# Patient Record
Sex: MALE | Race: WHITE | Employment: UNEMPLOYED | URBAN - NONMETROPOLITAN AREA
[De-identification: names, ages, dates, MRNs, and addresses within clinical notes are randomized per-mention and may not be internally consistent; named-entity substitution may affect disease eponyms.]

---

## 2005-04-14 LAB — TSH SERPL DL<=0.05 MIU/L-ACNC: NORMAL UIU/ML

## 2015-09-03 LAB — DIABETIC RETINOPATHY: POSITIVE

## 2019-05-23 ENCOUNTER — OFFICE VISIT (OUTPATIENT)
Dept: FAMILY MEDICINE CLINIC | Age: 84
End: 2019-05-23
Payer: MEDICARE

## 2019-05-23 VITALS
BODY MASS INDEX: 25.34 KG/M2 | DIASTOLIC BLOOD PRESSURE: 78 MMHG | HEIGHT: 70 IN | HEART RATE: 71 BPM | SYSTOLIC BLOOD PRESSURE: 120 MMHG | OXYGEN SATURATION: 98 % | WEIGHT: 177 LBS

## 2019-05-23 DIAGNOSIS — E78.5 HYPERLIPIDEMIA, UNSPECIFIED HYPERLIPIDEMIA TYPE: ICD-10-CM

## 2019-05-23 DIAGNOSIS — I10 ESSENTIAL HYPERTENSION: ICD-10-CM

## 2019-05-23 DIAGNOSIS — Z79.4 TYPE 2 DIABETES MELLITUS WITHOUT COMPLICATION, WITH LONG-TERM CURRENT USE OF INSULIN (HCC): Primary | ICD-10-CM

## 2019-05-23 DIAGNOSIS — I25.10 CORONARY ARTERY DISEASE INVOLVING NATIVE HEART WITHOUT ANGINA PECTORIS, UNSPECIFIED VESSEL OR LESION TYPE: ICD-10-CM

## 2019-05-23 DIAGNOSIS — K21.9 GASTROESOPHAGEAL REFLUX DISEASE WITHOUT ESOPHAGITIS: ICD-10-CM

## 2019-05-23 DIAGNOSIS — N18.9 CHRONIC KIDNEY DISEASE, UNSPECIFIED CKD STAGE: ICD-10-CM

## 2019-05-23 DIAGNOSIS — E11.9 TYPE 2 DIABETES MELLITUS WITHOUT COMPLICATION, WITH LONG-TERM CURRENT USE OF INSULIN (HCC): Primary | ICD-10-CM

## 2019-05-23 PROCEDURE — 99214 OFFICE O/P EST MOD 30 MIN: CPT | Performed by: INTERNAL MEDICINE

## 2019-05-23 RX ORDER — FAMOTIDINE 20 MG/1
20 TABLET, FILM COATED ORAL DAILY
COMMUNITY
End: 2019-05-23 | Stop reason: SDUPTHER

## 2019-05-23 RX ORDER — CLOPIDOGREL BISULFATE 75 MG/1
75 TABLET ORAL DAILY
COMMUNITY
End: 2019-05-23 | Stop reason: SDUPTHER

## 2019-05-23 RX ORDER — FAMOTIDINE 20 MG/1
20 TABLET, FILM COATED ORAL DAILY
Qty: 30 TABLET | Refills: 5 | Status: SHIPPED | OUTPATIENT
Start: 2019-05-23 | End: 2019-08-08 | Stop reason: SDUPTHER

## 2019-05-23 RX ORDER — ISOSORBIDE MONONITRATE 120 MG/1
TABLET, EXTENDED RELEASE ORAL
Qty: 15 TABLET | Refills: 5 | Status: SHIPPED | OUTPATIENT
Start: 2019-05-23 | End: 2019-05-23 | Stop reason: SDUPTHER

## 2019-05-23 RX ORDER — ATORVASTATIN CALCIUM 20 MG/1
20 TABLET, FILM COATED ORAL DAILY
COMMUNITY
End: 2019-08-08 | Stop reason: SDUPTHER

## 2019-05-23 RX ORDER — LOSARTAN POTASSIUM 25 MG/1
25 TABLET ORAL DAILY
COMMUNITY
End: 2019-08-08 | Stop reason: SDUPTHER

## 2019-05-23 RX ORDER — ISOSORBIDE MONONITRATE 120 MG/1
120 TABLET, EXTENDED RELEASE ORAL DAILY
COMMUNITY
End: 2019-05-23 | Stop reason: SDUPTHER

## 2019-05-23 RX ORDER — CLOPIDOGREL BISULFATE 75 MG/1
75 TABLET ORAL DAILY
Qty: 30 TABLET | Refills: 5 | Status: SHIPPED | OUTPATIENT
Start: 2019-05-23 | End: 2019-12-12 | Stop reason: SDUPTHER

## 2019-05-23 ASSESSMENT — ENCOUNTER SYMPTOMS
RHINORRHEA: 0
CONSTIPATION: 0
NAUSEA: 0
DIARRHEA: 0
BACK PAIN: 0
WHEEZING: 0
VOMITING: 0
SHORTNESS OF BREATH: 0
BLOOD IN STOOL: 0
SINUS PAIN: 0
ABDOMINAL PAIN: 0
COUGH: 0

## 2019-05-23 ASSESSMENT — PATIENT HEALTH QUESTIONNAIRE - PHQ9
2. FEELING DOWN, DEPRESSED OR HOPELESS: 0
1. LITTLE INTEREST OR PLEASURE IN DOING THINGS: 0
SUM OF ALL RESPONSES TO PHQ9 QUESTIONS 1 & 2: 0
SUM OF ALL RESPONSES TO PHQ QUESTIONS 1-9: 0
SUM OF ALL RESPONSES TO PHQ QUESTIONS 1-9: 0

## 2019-05-24 RX ORDER — ISOSORBIDE MONONITRATE 120 MG/1
TABLET, EXTENDED RELEASE ORAL
Qty: 45 TABLET | Refills: 5 | Status: SHIPPED | OUTPATIENT
Start: 2019-05-24 | End: 2019-12-12 | Stop reason: SDUPTHER

## 2019-05-24 NOTE — PROGRESS NOTES
MHYX GISELLB RYAN      19  Joseph IBARRA Covert : 1935 Sex: male  Age: 82 y.o. Chief Complaint   Patient presents with    Hyperlipidemia    Hypertension   Multiple medical problems follow-up    HPI . Patient presents today for follow-up visit for multiple medical problems. he is accompanied by his wife today. Reviewed last couple notes. Blood pressures have been good. Blood sugars have been good. he is up-to-date with cardiology, endocrine, nephrology, hematology/ophthalmology. Reviewed recent consult notes. He did have a stress test in the interim which was okay. . Blood sugars have been reasonable range. Last hemoglobin A1c checked by endocrine was 6.8. He follows with renal, endocrine dermatology, cardiology and ophthalmology. His eyes and feet are being checked with his diabetes and are up-to-date. Review of Systems   Constitutional: Negative for activity change, appetite change, chills, diaphoresis, fatigue, fever and unexpected weight change. HENT: Negative for congestion, ear pain, hearing loss, postnasal drip, rhinorrhea and sinus pain. Respiratory: Negative for cough, shortness of breath and wheezing. Cardiovascular: Negative for chest pain, palpitations and leg swelling. Gastrointestinal: Negative for abdominal pain, blood in stool, constipation, diarrhea, nausea and vomiting. Endocrine: Negative. Genitourinary: Negative for difficulty urinating, dysuria, frequency, hematuria and urgency. Musculoskeletal: Positive for arthralgias. Negative for back pain, gait problem and myalgias. Skin: Negative. Allergic/Immunologic: Negative for environmental allergies and immunocompromised state. Neurological: Positive for dizziness. Negative for weakness, light-headedness, numbness and headaches. Dizziness is occasional and improved   Hematological: Negative. Psychiatric/Behavioral: Negative for behavioral problems, confusion and sleep disturbance.  The patient is not nervous/anxious. REST OF PERTINENT ROS GONE OVER AND WAS NEGATIVE. Current Outpatient Medications:     atorvastatin (LIPITOR) 20 MG tablet, Take 20 mg by mouth daily, Disp: , Rfl:     losartan (COZAAR) 25 MG tablet, Take 25 mg by mouth daily, Disp: , Rfl:     clopidogrel (PLAVIX) 75 MG tablet, Take 1 tablet by mouth daily, Disp: 30 tablet, Rfl: 5    famotidine (PEPCID) 20 MG tablet, Take 1 tablet by mouth daily, Disp: 30 tablet, Rfl: 5    metoprolol tartrate (LOPRESSOR) 25 MG tablet, TAKE 1/2 TAB PO BID DAILY, Disp: 30 tablet, Rfl: 5    isosorbide mononitrate (IMDUR) 120 MG extended release tablet, TAKE 1/2 TAB PO DAILY, Disp: 15 tablet, Rfl: 5  Allergies   Allergen Reactions    Erythromycin     Penicillins        Past Medical History:   Diagnosis Date    Chronic kidney disease 5/23/2019    Coronary artery disease involving native heart without angina pectoris 5/23/2019    Essential hypertension 5/23/2019    Hyperlipidemia 5/23/2019    Type 2 diabetes mellitus without complication, with long-term current use of insulin ([facility] [address] [facility] 5/23/2019     History reviewed. No pertinent surgical history. History reviewed. No pertinent family history.   Social History     Socioeconomic History    Marital status: Unknown     Spouse name: Not on file    Number of children: Not on file    Years of education: Not on file    Highest education level: Not on file   Occupational History    Not on file   Social Needs    Financial resource strain: Not on file    Food insecurity:     Worry: Not on file     Inability: Not on file    Transportation needs:     Medical: Not on file     Non-medical: Not on file   Tobacco Use    Smoking status: Never Smoker    Smokeless tobacco: Never Used   Substance and Sexual Activity    Alcohol use: Not on file    Drug use: Not on file    Sexual activity: Not on file   Lifestyle    Physical activity:     Days per week: Not on file     Minutes per session: Not on file    artery disease involving native heart without angina pectoris, unspecified vessel or lesion type  -     clopidogrel (PLAVIX) 75 MG tablet; Take 1 tablet by mouth daily  -     isosorbide mononitrate (IMDUR) 120 MG extended release tablet; TAKE 1/2 TAB PO DAILY    Essential hypertension  -     metoprolol tartrate (LOPRESSOR) 25 MG tablet; TAKE 1/2 TAB PO BID DAILY    Hyperlipidemia, unspecified hyperlipidemia type    Chronic kidney disease, unspecified CKD stage    Gastroesophageal reflux disease without esophagitis  -     famotidine (PEPCID) 20 MG tablet; Take 1 tablet by mouth daily    Plan: We'll see back in 3 months when necessary. Prostate exam and PSA at that time. Check blood work at that times well. He has had everything done recently. Her last blood work from February is get blood work through endocrine and renal as well. Follow multiple consultants. Continue to monitor blood pressure and blood sugar closely. Fall precautions. Notify us with problems in the interim. Prescription management performed. Return in about 3 months (around 8/23/2019). Seen By:  Aissatou Augustine MD      *Document was created using voice recognition software. Note was reviewed however may contain grammatical errors.

## 2019-08-08 ENCOUNTER — TELEPHONE (OUTPATIENT)
Dept: FAMILY MEDICINE CLINIC | Age: 84
End: 2019-08-08

## 2019-08-08 ENCOUNTER — OFFICE VISIT (OUTPATIENT)
Dept: FAMILY MEDICINE CLINIC | Age: 84
End: 2019-08-08
Payer: MEDICARE

## 2019-08-08 ENCOUNTER — HOSPITAL ENCOUNTER (OUTPATIENT)
Age: 84
Discharge: HOME | End: 2019-08-10
Payer: MEDICARE

## 2019-08-08 VITALS
BODY MASS INDEX: 24.64 KG/M2 | DIASTOLIC BLOOD PRESSURE: 64 MMHG | HEART RATE: 74 BPM | HEIGHT: 71 IN | SYSTOLIC BLOOD PRESSURE: 118 MMHG | WEIGHT: 176 LBS | OXYGEN SATURATION: 96 %

## 2019-08-08 DIAGNOSIS — I25.10 CORONARY ARTERY DISEASE INVOLVING NATIVE HEART WITHOUT ANGINA PECTORIS, UNSPECIFIED VESSEL OR LESION TYPE: ICD-10-CM

## 2019-08-08 DIAGNOSIS — E11.9 TYPE 2 DIABETES MELLITUS WITHOUT COMPLICATION, WITH LONG-TERM CURRENT USE OF INSULIN (HCC): Primary | ICD-10-CM

## 2019-08-08 DIAGNOSIS — I10 ESSENTIAL HYPERTENSION: ICD-10-CM

## 2019-08-08 DIAGNOSIS — N18.9 CHRONIC KIDNEY DISEASE, UNSPECIFIED CKD STAGE: ICD-10-CM

## 2019-08-08 DIAGNOSIS — Z12.5 SCREENING FOR MALIGNANT NEOPLASM OF PROSTATE: ICD-10-CM

## 2019-08-08 DIAGNOSIS — K21.9 GASTROESOPHAGEAL REFLUX DISEASE WITHOUT ESOPHAGITIS: ICD-10-CM

## 2019-08-08 DIAGNOSIS — R97.20 ELEVATED PROSTATE SPECIFIC ANTIGEN (PSA): Primary | ICD-10-CM

## 2019-08-08 DIAGNOSIS — E11.9 TYPE 2 DIABETES MELLITUS WITHOUT COMPLICATION, WITH LONG-TERM CURRENT USE OF INSULIN (HCC): ICD-10-CM

## 2019-08-08 DIAGNOSIS — Z79.4 TYPE 2 DIABETES MELLITUS WITHOUT COMPLICATION, WITH LONG-TERM CURRENT USE OF INSULIN (HCC): Primary | ICD-10-CM

## 2019-08-08 DIAGNOSIS — Z79.4 TYPE 2 DIABETES MELLITUS WITHOUT COMPLICATION, WITH LONG-TERM CURRENT USE OF INSULIN (HCC): ICD-10-CM

## 2019-08-08 LAB
ALBUMIN SERPL-MCNC: 4.7 G/DL (ref 3.5–5.2)
ALP BLD-CCNC: 76 U/L (ref 40–129)
ALT SERPL-CCNC: 30 U/L (ref 0–40)
ANION GAP SERPL CALCULATED.3IONS-SCNC: 18 MMOL/L (ref 7–16)
AST SERPL-CCNC: 39 U/L (ref 0–39)
BASOPHILS ABSOLUTE: 0.06 E9/L (ref 0–0.2)
BASOPHILS RELATIVE PERCENT: 0.8 % (ref 0–2)
BILIRUB SERPL-MCNC: 0.8 MG/DL (ref 0–1.2)
BUN BLDV-MCNC: 16 MG/DL (ref 8–23)
CALCIUM SERPL-MCNC: 10.4 MG/DL (ref 8.6–10.2)
CHLORIDE BLD-SCNC: 99 MMOL/L (ref 98–107)
CHOLESTEROL, TOTAL: 190 MG/DL (ref 0–199)
CO2: 22 MMOL/L (ref 22–29)
CREAT SERPL-MCNC: 1.9 MG/DL (ref 0.7–1.2)
CREATININE URINE: 78 MG/DL (ref 40–278)
EOSINOPHILS ABSOLUTE: 0.22 E9/L (ref 0.05–0.5)
EOSINOPHILS RELATIVE PERCENT: 3.1 % (ref 0–6)
GFR AFRICAN AMERICAN: 41
GFR NON-AFRICAN AMERICAN: 34 ML/MIN/1.73
GLUCOSE BLD-MCNC: 129 MG/DL (ref 74–99)
HBA1C MFR BLD: 7.1 % (ref 4–5.6)
HCT VFR BLD CALC: 42.4 % (ref 37–54)
HDLC SERPL-MCNC: 54 MG/DL
HEMOGLOBIN: 13.8 G/DL (ref 12.5–16.5)
IMMATURE GRANULOCYTES #: 0.02 E9/L
IMMATURE GRANULOCYTES %: 0.3 % (ref 0–5)
LDL CHOLESTEROL CALCULATED: 114 MG/DL (ref 0–99)
LYMPHOCYTES ABSOLUTE: 2.32 E9/L (ref 1.5–4)
LYMPHOCYTES RELATIVE PERCENT: 32.5 % (ref 20–42)
MCH RBC QN AUTO: 31.7 PG (ref 26–35)
MCHC RBC AUTO-ENTMCNC: 32.5 % (ref 32–34.5)
MCV RBC AUTO: 97.5 FL (ref 80–99.9)
MICROALBUMIN UR-MCNC: 66.5 MG/L
MICROALBUMIN/CREAT UR-RTO: 85.3 (ref 0–30)
MONOCYTES ABSOLUTE: 0.87 E9/L (ref 0.1–0.95)
MONOCYTES RELATIVE PERCENT: 12.2 % (ref 2–12)
NEUTROPHILS ABSOLUTE: 3.64 E9/L (ref 1.8–7.3)
NEUTROPHILS RELATIVE PERCENT: 51.1 % (ref 43–80)
PDW BLD-RTO: 12.5 FL (ref 11.5–15)
PLATELET # BLD: 257 E9/L (ref 130–450)
PMV BLD AUTO: 9.6 FL (ref 7–12)
POTASSIUM SERPL-SCNC: 5.7 MMOL/L (ref 3.5–5)
PROSTATE SPECIFIC ANTIGEN: 4.68 NG/ML (ref 0–4)
RBC # BLD: 4.35 E12/L (ref 3.8–5.8)
SODIUM BLD-SCNC: 139 MMOL/L (ref 132–146)
TOTAL PROTEIN: 8.1 G/DL (ref 6.4–8.3)
TRIGL SERPL-MCNC: 110 MG/DL (ref 0–149)
VLDLC SERPL CALC-MCNC: 22 MG/DL
WBC # BLD: 7.1 E9/L (ref 4.5–11.5)

## 2019-08-08 PROCEDURE — 82044 UR ALBUMIN SEMIQUANTITATIVE: CPT

## 2019-08-08 PROCEDURE — 80061 LIPID PANEL: CPT

## 2019-08-08 PROCEDURE — 85025 COMPLETE CBC W/AUTO DIFF WBC: CPT

## 2019-08-08 PROCEDURE — 82570 ASSAY OF URINE CREATININE: CPT

## 2019-08-08 PROCEDURE — 83036 HEMOGLOBIN GLYCOSYLATED A1C: CPT

## 2019-08-08 PROCEDURE — 80053 COMPREHEN METABOLIC PANEL: CPT

## 2019-08-08 PROCEDURE — 99214 OFFICE O/P EST MOD 30 MIN: CPT | Performed by: INTERNAL MEDICINE

## 2019-08-08 PROCEDURE — 36415 COLL VENOUS BLD VENIPUNCTURE: CPT

## 2019-08-08 PROCEDURE — G0103 PSA SCREENING: HCPCS

## 2019-08-08 RX ORDER — ATORVASTATIN CALCIUM 20 MG/1
20 TABLET, FILM COATED ORAL DAILY
Qty: 90 TABLET | Refills: 1 | Status: SHIPPED | OUTPATIENT
Start: 2019-08-08 | End: 2019-12-12 | Stop reason: SDUPTHER

## 2019-08-08 RX ORDER — LOSARTAN POTASSIUM 25 MG/1
25 TABLET ORAL DAILY
Qty: 90 TABLET | Refills: 1 | Status: SHIPPED | OUTPATIENT
Start: 2019-08-08 | End: 2019-12-12 | Stop reason: SDUPTHER

## 2019-08-08 RX ORDER — FAMOTIDINE 20 MG/1
20 TABLET, FILM COATED ORAL DAILY
Qty: 90 TABLET | Refills: 1 | Status: SHIPPED | OUTPATIENT
Start: 2019-08-08 | End: 2019-12-12 | Stop reason: SDUPTHER

## 2019-08-08 NOTE — PROGRESS NOTES
tablet, Rfl: 5    metoprolol tartrate (LOPRESSOR) 25 MG tablet, TAKE 1/2 TAB PO BID DAILY, Disp: 30 tablet, Rfl: 5  Allergies   Allergen Reactions    Erythromycin     Penicillins        Past Medical History:   Diagnosis Date    Chronic kidney disease 5/23/2019    Coronary artery disease involving native heart without angina pectoris 5/23/2019    Essential hypertension 5/23/2019    Hyperlipidemia 5/23/2019    Type 2 diabetes mellitus without complication, with long-term current use of [facility] 5/23/2019     No past surgical history on file. No family history on file.   Social History     Socioeconomic History    Marital status: Unknown     Spouse name: Not on file    Number of children: Not on file    Years of education: Not on file    Highest education level: Not on file   Occupational History    Not on file   Social Needs    Financial resource strain: Not on file    Food insecurity:     Worry: Not on file     Inability: Not on file    Transportation needs:     Medical: Not on file     Non-medical: Not on file   Tobacco Use    Smoking status: Never Smoker    Smokeless tobacco: Never Used   Substance and Sexual Activity    Alcohol use: Not on file    Drug use: Not on file    Sexual activity: Not on file   Lifestyle    Physical activity:     Days per week: Not on file     Minutes per session: Not on file    Stress: Not on file   Relationships    Social connections:     Talks on phone: Not on file     Gets together: Not on file     Attends Jehovah's witness service: Not on file     Active member of club or organization: Not on file     Attends meetings of clubs or organizations: Not on file     Relationship status: Not on file    Intimate partner violence:     Fear of current or ex partner: Not on file     Emotionally abused: Not on file     Physically abused: Not on file     Forced sexual activity: Not on file   Other Topics Concern    Not on file   Social History Narrative    Not on file Vitals:    08/08/19 0717   BP: 118/64   Pulse: 74   SpO2: 96%   Weight: 176 lb (79.8 kg)   Height: 5' 11\" (1.803 m)       Physical Exam  Const: Appears well developed and well nourished. No signs of acute distress present. Neck: Supple and symmetric. Palpation reveals no adenopathy. No masses appreciated. Thyroid  exhibits no nodule or thyromegaly. No JVD. Carotids: 2+ and equal bilaterally, without bruits. Resp: No rales, rhonchi or wheezes appreciated over the lungs bilaterally. CV: Rate is regular. Rhythm is regular. S1 is normal. S2 is normal. No gallop or rubs. No heart  murmur appreciated/pulses distally are palpable and okay. Extremities: Edema, but no clubbing or  cyanosis/trace  Abdomen: Bowel sounds are normoactive. Palpation reveals softness, with no distension,  organomegaly or tenderness. No abdominal masses palpable. No palpable hepatosplenomegaly. Musculo: Walks with a normal gait. Upper Extremities: Full ROM bilaterally. Lower Extremities: Full  ROM bilaterally. Psych: Patient's attitude is cooperative. Patient's affect is appropriate. Judgement is realistic. Insight  is appropriate. Rectal exam: Sphincter tone good. Stool for occult blood guaiac negative. No rectal masses. Prostate was not palpably enlarged. No nodules noted. Genital exam is unremarkable. Assessment and Plan:  Rafal Later was seen today for hypertension and diabetes. Diagnoses and all orders for this visit:    Type 2 diabetes mellitus without complication, with long-term current use of insulin ([facility])  -     Hemoglobin A1C; Future  -     Microalbumin / Creatinine Urine Ratio; Future    Essential hypertension  -     losartan (COZAAR) 25 MG tablet; Take 1 tablet by mouth daily  -     Comprehensive Metabolic Panel; Future  -     CBC Auto Differential; Future    Coronary artery disease involving native heart without angina pectoris, unspecified vessel or lesion type  -     atorvastatin (LIPITOR) 20 MG tablet;  Take 1 tablet by mouth daily  -     Lipid Panel; Future    Chronic kidney disease, unspecified CKD stage    Gastroesophageal reflux disease without esophagitis  -     famotidine (PEPCID) 20 MG tablet; Take 1 tablet by mouth daily    Screening for malignant neoplasm of prostate  -     Psa screening; Future    Plan: Blood work today to monitor disease progression and medication use. Copy to renal and endocrine. Prescription management performed. I will see back in 4 months and as needed. Continue to monitor blood pressure and blood sugar closely. Prostate exam was performed. Fall precautions. Notify us with problems in the interim. Return in about 4 months (around 12/8/2019). Seen By:  Charles Hunt MD      *Document was created using voice recognition software. Note was reviewed however may contain grammatical errors.

## 2019-08-09 NOTE — TELEPHONE ENCOUNTER
Kidney function has worsened. Increase fluid intake and make sure not taking nonsteroidals. Potassium 5.7. This needs repeated at the hospital in the next couple days. PSA is rising. Referral and to urology/Dr. Christian Alvares. Send copy of blood work to Samantha Ramos and his renal physician

## 2019-12-09 DIAGNOSIS — I10 ESSENTIAL HYPERTENSION: ICD-10-CM

## 2019-12-12 ENCOUNTER — TELEPHONE (OUTPATIENT)
Dept: FAMILY MEDICINE CLINIC | Age: 84
End: 2019-12-12

## 2019-12-12 ENCOUNTER — HOSPITAL ENCOUNTER (OUTPATIENT)
Age: 84
Discharge: HOME | End: 2019-12-14
Payer: MEDICARE

## 2019-12-12 ENCOUNTER — OFFICE VISIT (OUTPATIENT)
Dept: FAMILY MEDICINE CLINIC | Age: 84
End: 2019-12-12
Payer: MEDICARE

## 2019-12-12 VITALS
DIASTOLIC BLOOD PRESSURE: 74 MMHG | WEIGHT: 182 LBS | OXYGEN SATURATION: 97 % | SYSTOLIC BLOOD PRESSURE: 136 MMHG | HEART RATE: 68 BPM | BODY MASS INDEX: 25.38 KG/M2

## 2019-12-12 DIAGNOSIS — E11.9 TYPE 2 DIABETES MELLITUS WITHOUT COMPLICATION, WITH LONG-TERM CURRENT USE OF INSULIN (HCC): ICD-10-CM

## 2019-12-12 DIAGNOSIS — Z79.4 TYPE 2 DIABETES MELLITUS WITHOUT COMPLICATION, WITH LONG-TERM CURRENT USE OF INSULIN (HCC): Primary | ICD-10-CM

## 2019-12-12 DIAGNOSIS — N18.9 CHRONIC KIDNEY DISEASE, UNSPECIFIED CKD STAGE: ICD-10-CM

## 2019-12-12 DIAGNOSIS — I10 ESSENTIAL HYPERTENSION: ICD-10-CM

## 2019-12-12 DIAGNOSIS — Z79.4 TYPE 2 DIABETES MELLITUS WITHOUT COMPLICATION, WITH LONG-TERM CURRENT USE OF INSULIN (HCC): ICD-10-CM

## 2019-12-12 DIAGNOSIS — K21.9 GASTROESOPHAGEAL REFLUX DISEASE WITHOUT ESOPHAGITIS: ICD-10-CM

## 2019-12-12 DIAGNOSIS — E11.9 TYPE 2 DIABETES MELLITUS WITHOUT COMPLICATION, WITH LONG-TERM CURRENT USE OF INSULIN (HCC): Primary | ICD-10-CM

## 2019-12-12 DIAGNOSIS — R97.20 ELEVATED PROSTATE SPECIFIC ANTIGEN (PSA): ICD-10-CM

## 2019-12-12 DIAGNOSIS — I25.10 CORONARY ARTERY DISEASE INVOLVING NATIVE HEART WITHOUT ANGINA PECTORIS, UNSPECIFIED VESSEL OR LESION TYPE: ICD-10-CM

## 2019-12-12 LAB
ALBUMIN SERPL-MCNC: 3.9 G/DL (ref 3.5–5.2)
ALP BLD-CCNC: 72 U/L (ref 40–129)
ALT SERPL-CCNC: 31 U/L (ref 0–40)
ANION GAP SERPL CALCULATED.3IONS-SCNC: 12 MMOL/L (ref 7–16)
AST SERPL-CCNC: 39 U/L (ref 0–39)
BASOPHILS ABSOLUTE: 0.05 E9/L (ref 0–0.2)
BASOPHILS RELATIVE PERCENT: 0.8 % (ref 0–2)
BILIRUB SERPL-MCNC: 0.6 MG/DL (ref 0–1.2)
BUN BLDV-MCNC: 18 MG/DL (ref 8–23)
CALCIUM SERPL-MCNC: 9.6 MG/DL (ref 8.6–10.2)
CHLORIDE BLD-SCNC: 108 MMOL/L (ref 98–107)
CHOLESTEROL, TOTAL: 134 MG/DL (ref 0–199)
CO2: 23 MMOL/L (ref 22–29)
CREAT SERPL-MCNC: 1.6 MG/DL (ref 0.7–1.2)
CREATININE URINE: 87 MG/DL (ref 40–278)
EOSINOPHILS ABSOLUTE: 0.14 E9/L (ref 0.05–0.5)
EOSINOPHILS RELATIVE PERCENT: 2.2 % (ref 0–6)
GFR AFRICAN AMERICAN: 50
GFR NON-AFRICAN AMERICAN: 42 ML/MIN/1.73
GLUCOSE BLD-MCNC: 95 MG/DL (ref 74–99)
HBA1C MFR BLD: 6.9 % (ref 4–5.6)
HCT VFR BLD CALC: 41 % (ref 37–54)
HDLC SERPL-MCNC: 46 MG/DL
HEMOGLOBIN: 12.9 G/DL (ref 12.5–16.5)
IMMATURE GRANULOCYTES #: 0.01 E9/L
IMMATURE GRANULOCYTES %: 0.2 % (ref 0–5)
LDL CHOLESTEROL CALCULATED: 75 MG/DL (ref 0–99)
LYMPHOCYTES ABSOLUTE: 1.88 E9/L (ref 1.5–4)
LYMPHOCYTES RELATIVE PERCENT: 29.5 % (ref 20–42)
MCH RBC QN AUTO: 30.8 PG (ref 26–35)
MCHC RBC AUTO-ENTMCNC: 31.5 % (ref 32–34.5)
MCV RBC AUTO: 97.9 FL (ref 80–99.9)
MICROALBUMIN UR-MCNC: 95.2 MG/L
MICROALBUMIN/CREAT UR-RTO: 109.4 (ref 0–30)
MONOCYTES ABSOLUTE: 0.65 E9/L (ref 0.1–0.95)
MONOCYTES RELATIVE PERCENT: 10.2 % (ref 2–12)
NEUTROPHILS ABSOLUTE: 3.64 E9/L (ref 1.8–7.3)
NEUTROPHILS RELATIVE PERCENT: 57.1 % (ref 43–80)
PDW BLD-RTO: 12.3 FL (ref 11.5–15)
PLATELET # BLD: 228 E9/L (ref 130–450)
PMV BLD AUTO: 9.7 FL (ref 7–12)
POTASSIUM SERPL-SCNC: 4.9 MMOL/L (ref 3.5–5)
RBC # BLD: 4.19 E12/L (ref 3.8–5.8)
SODIUM BLD-SCNC: 143 MMOL/L (ref 132–146)
TOTAL PROTEIN: 7.3 G/DL (ref 6.4–8.3)
TRIGL SERPL-MCNC: 66 MG/DL (ref 0–149)
VLDLC SERPL CALC-MCNC: 13 MG/DL
WBC # BLD: 6.4 E9/L (ref 4.5–11.5)

## 2019-12-12 PROCEDURE — 82044 UR ALBUMIN SEMIQUANTITATIVE: CPT

## 2019-12-12 PROCEDURE — 4040F PNEUMOC VAC/ADMIN/RCVD: CPT | Performed by: INTERNAL MEDICINE

## 2019-12-12 PROCEDURE — 80053 COMPREHEN METABOLIC PANEL: CPT

## 2019-12-12 PROCEDURE — 80061 LIPID PANEL: CPT

## 2019-12-12 PROCEDURE — G8417 CALC BMI ABV UP PARAM F/U: HCPCS | Performed by: INTERNAL MEDICINE

## 2019-12-12 PROCEDURE — G8482 FLU IMMUNIZE ORDER/ADMIN: HCPCS | Performed by: INTERNAL MEDICINE

## 2019-12-12 PROCEDURE — 36415 COLL VENOUS BLD VENIPUNCTURE: CPT

## 2019-12-12 PROCEDURE — 85025 COMPLETE CBC W/AUTO DIFF WBC: CPT

## 2019-12-12 PROCEDURE — 82570 ASSAY OF URINE CREATININE: CPT

## 2019-12-12 PROCEDURE — 83036 HEMOGLOBIN GLYCOSYLATED A1C: CPT

## 2019-12-12 PROCEDURE — G8427 DOCREV CUR MEDS BY ELIG CLIN: HCPCS | Performed by: INTERNAL MEDICINE

## 2019-12-12 PROCEDURE — 99214 OFFICE O/P EST MOD 30 MIN: CPT | Performed by: INTERNAL MEDICINE

## 2019-12-12 PROCEDURE — 1036F TOBACCO NON-USER: CPT | Performed by: INTERNAL MEDICINE

## 2019-12-12 PROCEDURE — G8598 ASA/ANTIPLAT THER USED: HCPCS | Performed by: INTERNAL MEDICINE

## 2019-12-12 PROCEDURE — 1123F ACP DISCUSS/DSCN MKR DOCD: CPT | Performed by: INTERNAL MEDICINE

## 2019-12-12 RX ORDER — LOSARTAN POTASSIUM 25 MG/1
25 TABLET ORAL DAILY
Qty: 90 TABLET | Refills: 1 | Status: SHIPPED
Start: 2019-12-12 | End: 2020-03-16 | Stop reason: SDUPTHER

## 2019-12-12 RX ORDER — FAMOTIDINE 20 MG/1
20 TABLET, FILM COATED ORAL DAILY
Qty: 90 TABLET | Refills: 1 | Status: SHIPPED
Start: 2019-12-12 | End: 2020-03-16 | Stop reason: SDUPTHER

## 2019-12-12 RX ORDER — ISOSORBIDE MONONITRATE 120 MG/1
TABLET, EXTENDED RELEASE ORAL
Qty: 45 TABLET | Refills: 1 | Status: SHIPPED
Start: 2019-12-12 | End: 2020-03-16 | Stop reason: SDUPTHER

## 2019-12-12 RX ORDER — ATORVASTATIN CALCIUM 20 MG/1
20 TABLET, FILM COATED ORAL EVERY OTHER DAY
Qty: 45 TABLET | Refills: 1 | Status: SHIPPED
Start: 2019-12-12 | End: 2020-03-16 | Stop reason: SDUPTHER

## 2019-12-12 RX ORDER — CLOPIDOGREL BISULFATE 75 MG/1
75 TABLET ORAL DAILY
Qty: 90 TABLET | Refills: 1 | Status: SHIPPED
Start: 2019-12-12 | End: 2020-03-16 | Stop reason: SDUPTHER

## 2019-12-12 SDOH — ECONOMIC STABILITY: INCOME INSECURITY: HOW HARD IS IT FOR YOU TO PAY FOR THE VERY BASICS LIKE FOOD, HOUSING, MEDICAL CARE, AND HEATING?: VERY HARD

## 2019-12-12 SDOH — ECONOMIC STABILITY: TRANSPORTATION INSECURITY
IN THE PAST 12 MONTHS, HAS LACK OF TRANSPORTATION KEPT YOU FROM MEETINGS, WORK, OR FROM GETTING THINGS NEEDED FOR DAILY LIVING?: NO

## 2019-12-12 SDOH — ECONOMIC STABILITY: FOOD INSECURITY: WITHIN THE PAST 12 MONTHS, THE FOOD YOU BOUGHT JUST DIDN'T LAST AND YOU DIDN'T HAVE MONEY TO GET MORE.: NEVER TRUE

## 2019-12-12 SDOH — ECONOMIC STABILITY: FOOD INSECURITY: WITHIN THE PAST 12 MONTHS, YOU WORRIED THAT YOUR FOOD WOULD RUN OUT BEFORE YOU GOT MONEY TO BUY MORE.: NEVER TRUE

## 2019-12-12 SDOH — ECONOMIC STABILITY: TRANSPORTATION INSECURITY
IN THE PAST 12 MONTHS, HAS THE LACK OF TRANSPORTATION KEPT YOU FROM MEDICAL APPOINTMENTS OR FROM GETTING MEDICATIONS?: NO

## 2019-12-18 ENCOUNTER — TELEPHONE (OUTPATIENT)
Dept: FAMILY MEDICINE CLINIC | Age: 84
End: 2019-12-18

## 2020-03-16 ENCOUNTER — OFFICE VISIT (OUTPATIENT)
Dept: FAMILY MEDICINE CLINIC | Age: 85
End: 2020-03-16
Payer: MEDICARE

## 2020-03-16 VITALS
HEART RATE: 69 BPM | WEIGHT: 178 LBS | TEMPERATURE: 98.4 F | OXYGEN SATURATION: 95 % | BODY MASS INDEX: 24.83 KG/M2 | DIASTOLIC BLOOD PRESSURE: 72 MMHG | SYSTOLIC BLOOD PRESSURE: 134 MMHG

## 2020-03-16 PROCEDURE — 4040F PNEUMOC VAC/ADMIN/RCVD: CPT | Performed by: INTERNAL MEDICINE

## 2020-03-16 PROCEDURE — G8420 CALC BMI NORM PARAMETERS: HCPCS | Performed by: INTERNAL MEDICINE

## 2020-03-16 PROCEDURE — 1123F ACP DISCUSS/DSCN MKR DOCD: CPT | Performed by: INTERNAL MEDICINE

## 2020-03-16 PROCEDURE — G8482 FLU IMMUNIZE ORDER/ADMIN: HCPCS | Performed by: INTERNAL MEDICINE

## 2020-03-16 PROCEDURE — 99214 OFFICE O/P EST MOD 30 MIN: CPT | Performed by: INTERNAL MEDICINE

## 2020-03-16 PROCEDURE — G8427 DOCREV CUR MEDS BY ELIG CLIN: HCPCS | Performed by: INTERNAL MEDICINE

## 2020-03-16 PROCEDURE — G8510 SCR DEP NEG, NO PLAN REQD: HCPCS | Performed by: INTERNAL MEDICINE

## 2020-03-16 PROCEDURE — 3288F FALL RISK ASSESSMENT DOCD: CPT | Performed by: INTERNAL MEDICINE

## 2020-03-16 PROCEDURE — 1036F TOBACCO NON-USER: CPT | Performed by: INTERNAL MEDICINE

## 2020-03-16 RX ORDER — CLOPIDOGREL BISULFATE 75 MG/1
75 TABLET ORAL DAILY
Qty: 90 TABLET | Refills: 1 | Status: SHIPPED
Start: 2020-03-16 | End: 2020-11-23 | Stop reason: SDUPTHER

## 2020-03-16 RX ORDER — ISOSORBIDE MONONITRATE 120 MG/1
TABLET, EXTENDED RELEASE ORAL
Qty: 45 TABLET | Refills: 1 | Status: SHIPPED
Start: 2020-03-16 | End: 2020-11-23 | Stop reason: ALTCHOICE

## 2020-03-16 RX ORDER — ATORVASTATIN CALCIUM 20 MG/1
20 TABLET, FILM COATED ORAL EVERY OTHER DAY
Qty: 45 TABLET | Refills: 1 | Status: SHIPPED
Start: 2020-03-16 | End: 2020-10-01

## 2020-03-16 RX ORDER — FAMOTIDINE 20 MG/1
20 TABLET, FILM COATED ORAL DAILY
Qty: 90 TABLET | Refills: 1 | Status: SHIPPED
Start: 2020-03-16 | End: 2020-11-23 | Stop reason: SDUPTHER

## 2020-03-16 RX ORDER — LOSARTAN POTASSIUM 25 MG/1
25 TABLET ORAL DAILY
Qty: 90 TABLET | Refills: 1 | Status: SHIPPED
Start: 2020-03-16 | End: 2020-07-20

## 2020-03-16 ASSESSMENT — PATIENT HEALTH QUESTIONNAIRE - PHQ9
SUM OF ALL RESPONSES TO PHQ QUESTIONS 1-9: 0
SUM OF ALL RESPONSES TO PHQ9 QUESTIONS 1 & 2: 0
1. LITTLE INTEREST OR PLEASURE IN DOING THINGS: 0
SUM OF ALL RESPONSES TO PHQ QUESTIONS 1-9: 0
2. FEELING DOWN, DEPRESSED OR HOPELESS: 0

## 2020-03-16 NOTE — PROGRESS NOTES
[address] [facility] [address]     3/16/20  Joseph IBARRA Covert : 1935 Sex: male  Age: 82 y.o. Chief Complaint   Patient presents with    Diabetes       HPI    Patient presents today accompanied by his wife for follow-up visit on his multiple medical problems. I reviewed last couple notes. Patient has had no further chest discomfort. We did adjust his insulin down last time because of some hypoglycemic events. His sugars he states have been running occasionally on the higher side but last hemoglobin A1c checked recently through endocrine was 6.7. I told him I would not make any further adjustments at this time and endocrine did leave his insulin dosage the same. He has had no further chest discomfort. He is up-to-date with all of his consultants including renal, endocrine, dermatology, cardiology, ophthalmology.         Review of Systems     Const: Reports fatigue, but denies chills, fever and sweats. CV:  denies  orthopnea and palpitations. Resp: Denies cough, SOB and wheezing. GI: Denies diarrhea, nausea and vomiting. Musculo: Reports arthritis, neck pain and back pain/neck pain improved  Neuro: Reports dizziness but denies headache, numbness/tingling, seizures, syncope and weakness/dizziness-stable-no falls  when he moves or gets up quickly. Endocrine: Reports diabetes. -See above  Hema/Lymph: Denies easy bleeding, excessive bleeding, postsurgical bleeding and bleeding/clotting  disorder. REST OF PERTINENT ROS GONE OVER AND WAS NEGATIVE.      PMH:  Problem List: Athscl heart disease of native coronary artery w/o ang pctrs, Essential hypertension,  Disorder of kidney and/or ureter, Proteinuria, Type 2 diabetes mellitus, Hyperlipidemia, Coronary  arteriosclerosis, Benign essential hypertension  Health Maintenance:  Influenza Vaccination - (10/11/2018)  Tetanus Immunization - (2018)  Couseled on Home Safety - (2018)  Colonoscopy - (10/11/2016)  Bone Density Test Screening - (2005)  Joseph IBARRA Covert  1935 Page #2  Stress Test - ,10/11,1/15-neg, 10/16-neg,-neg,-neg  Prostate Exam - ,7/10,,,,10/15, ,  PSA Test - \",7/10,,,, 2/15,11/15,,,  Rectal Exam - \",7/10,,,,10/15, ,  Hemmocult Cards - neg x 3-,3/12  EGD - ,,10/16-  2D ECHO - 10/11,   capsule endoscop -   EKG - ,  heart cath - ,9/15  Prevnar Vaccine - (2016)  Pneumonia Vaccination - (2009)  Zoster/Shingles Vaccine - given @ [facility]  Medical Problems:  Ischemic Cardiomyopathy, Renal Insufficiency, Proteinuria  Small Bowel Obstruction -   Non Insulin Dependent Diabetes  Coronary Artery Disease (CAD) - angioplasty with stent placement  Erythromycin Induced Hepatitis, Kidney Stones, Cervical Disc Surg/DR Meredith Rey, Sleep Apnea  positive lupus anticoagulant - prolonged ptt-saw heme/onc  CKD, Colon Polyps, Gastritis, helicobacter-treated, duodenal polyp, rotator cuff tear, Gastroesophageal  Reflux Disease (GERD), Hypertension, Hyperlipidemia, Diabetic Retinopathy  sees cardiology,derm,ophthalmology - endocrine,renal,gi  heart catheter angioplasty and drug-eluting stent - 9/15  Orthostatic hypotension - Probably Related to autonomic neuropathy secondary to diabetes  Nasal surgery/septoplasty; Dr. Katty Terry  Cataracts with IOL bilateral  Excision skin cancer scalp, Dr. CASA FELY [facility]  SH:  Marital: Legal Status: . retired printer  Personal Habits: Cigarette Use: Negative For current cigarette smoker. Alcohol: Occasionally  consumes alcohol.           Current Outpatient Medications:     atorvastatin (LIPITOR) 20 MG tablet, Take 1 tablet by mouth every other day, Disp: 45 tablet, Rfl: 1    clopidogrel (PLAVIX) 75 MG tablet, Take 1 tablet by mouth daily, Disp: 90 tablet, Rfl: 1    famotidine (PEPCID) 20 MG tablet, Take 1 tablet by mouth daily, Disp: 90 tablet, Rfl: 1    isosorbide mononitrate

## 2020-03-19 LAB — LV EF: 55 %

## 2020-07-20 ENCOUNTER — OFFICE VISIT (OUTPATIENT)
Dept: FAMILY MEDICINE CLINIC | Age: 85
End: 2020-07-20
Payer: MEDICARE

## 2020-07-20 VITALS
DIASTOLIC BLOOD PRESSURE: 70 MMHG | HEIGHT: 71 IN | TEMPERATURE: 97.1 F | BODY MASS INDEX: 24.78 KG/M2 | SYSTOLIC BLOOD PRESSURE: 112 MMHG | OXYGEN SATURATION: 97 % | WEIGHT: 177 LBS | HEART RATE: 83 BPM

## 2020-07-20 PROCEDURE — 99214 OFFICE O/P EST MOD 30 MIN: CPT | Performed by: INTERNAL MEDICINE

## 2020-07-20 PROCEDURE — 3051F HG A1C>EQUAL 7.0%<8.0%: CPT | Performed by: INTERNAL MEDICINE

## 2020-07-20 PROCEDURE — 4040F PNEUMOC VAC/ADMIN/RCVD: CPT | Performed by: INTERNAL MEDICINE

## 2020-07-20 PROCEDURE — G8427 DOCREV CUR MEDS BY ELIG CLIN: HCPCS | Performed by: INTERNAL MEDICINE

## 2020-07-20 PROCEDURE — G8420 CALC BMI NORM PARAMETERS: HCPCS | Performed by: INTERNAL MEDICINE

## 2020-07-20 PROCEDURE — 1123F ACP DISCUSS/DSCN MKR DOCD: CPT | Performed by: INTERNAL MEDICINE

## 2020-07-20 PROCEDURE — 1036F TOBACCO NON-USER: CPT | Performed by: INTERNAL MEDICINE

## 2020-07-20 PROCEDURE — 3288F FALL RISK ASSESSMENT DOCD: CPT | Performed by: INTERNAL MEDICINE

## 2020-07-20 RX ORDER — IBUPROFEN 200 MG
TABLET ORAL
COMMUNITY
Start: 2020-06-30

## 2020-07-20 RX ORDER — INSULIN LISPRO 100 [IU]/ML
INJECTION, SUSPENSION SUBCUTANEOUS
COMMUNITY
Start: 2020-07-10

## 2020-07-20 RX ORDER — INSULIN LISPRO 100 [IU]/ML
INJECTION, SUSPENSION SUBCUTANEOUS
COMMUNITY
Start: 2020-03-20 | End: 2020-07-20

## 2020-07-20 RX ORDER — PEN NEEDLE, DIABETIC 31 GX5/16"
NEEDLE, DISPOSABLE MISCELLANEOUS
COMMUNITY
Start: 2020-07-07

## 2020-07-20 RX ORDER — INSULIN GLULISINE 100 [IU]/ML
INJECTION, SOLUTION SUBCUTANEOUS
COMMUNITY
End: 2020-07-20

## 2020-07-20 NOTE — PROGRESS NOTES
[address] [facility] [address] [facility]     20  Joseph IBARRA Covert : 1935 Sex: male  Age: 82 y.o. Chief Complaint   Patient presents with    Diabetes       HPI  Patient presents today accompanied by his wife for 4-month follow-up visit on his multiple medical problems. Reviewed last note. Since I have seen him last he has been in the hospital for syncopal episode which was felt to be related to taking a nitroglycerin and getting up to quickly. He has had no further episodes. They did stop his losartan while in the hospital.  He has been checking blood pressures which look okay. I told him to continue staying off of it for now. He is going to be seeing renal upcoming and they may make further adjustment or restart at their discretion. He did have blood work done for both renal and endocrine within the past couple weeks at [facility] which I am going to try and obtain before ordering anything here today so as not to have duplication of services. He has had no falls since that syncopal episode. He does get occasional chest discomfort which is very short-lived and he states has not required nitroglycerin recently. Lipids have been stable. Last hemoglobin A1c was 6.7 and I did review endocrine's last note from recent. He is up-to-date with all of his consultants including renal, endocrine, dermatology, cardiology, ophthalmology      Review of Systems   Const: Reports fatigue (improved), but denies chills, fever and sweats. CV:  denies  orthopnea and palpitations.    Resp: Denies cough, SOB and wheezing. GI: Denies diarrhea, nausea and vomiting. Musculo: Reports arthritis, neck pain and back pain/neck pain improved  Neuro: Reports dizziness but denies headache, numbness/tingling, seizures, syncope and weakness/dizziness-improved --occurs when he moves or gets up quickly. Endocrine: Reports diabetes. -See above  Hema/Lymph: Denies easy bleeding, excessive bleeding, postsurgical bleeding and bleeding/clotting  disorder. REST OF PERTINENT ROS GONE OVER AND WAS NEGATIVE.    PMH:  Problem List: Athscl heart disease of native coronary artery w/o ang pctrs, Essential hypertension,  Disorder of kidney and/or ureter, Proteinuria, Type 2 diabetes mellitus, Hyperlipidemia, Coronary  arteriosclerosis, Benign essential hypertension  Health Maintenance:  Influenza Vaccination - (10/11/2018)  Tetanus Immunization - (2018)  Couseled on Home Safety - (2018)  Colonoscopy - (10/11/2016)  Bone Density Test Screening - (2005)  Joseph IBARRA Covert  1935 Page #2  Stress Test - ,10/11,1/15-neg, 10/16-neg,-neg,-neg  Prostate Exam - ,7/10,,,,10/15, ,  PSA Test - \",7/10,,,, 2/15,11/15,,,  Rectal Exam - \",7/10,,,,10/15, ,  Hemmocult Cards - neg x 3-,3/12  EGD - ,,10/16-  2D ECHO - 10/11,   capsule endoscop -   EKG - ,  heart cath - ,9/15  Prevnar Vaccine - (2016)  Pneumonia Vaccination - (2009)  Zoster/Shingles Vaccine - given @ [facility]  Medical Problems:  Ischemic Cardiomyopathy, Renal Insufficiency, Proteinuria  Small Bowel Obstruction -   Non Insulin Dependent Diabetes  Coronary Artery Disease (CAD) - angioplasty with stent placement  Erythromycin Induced Hepatitis, Kidney Stones, Cervical Disc Surg/DR Orlando Dominguez, Sleep Apnea  positive lupus anticoagulant - prolonged ptt-saw heme/onc  CKD, Colon Polyps, Gastritis, helicobacter-treated, duodenal polyp, rotator cuff tear, Gastroesophageal  Reflux Disease (GERD), Hypertension, Hyperlipidemia, Diabetic Retinopathy  sees cardiology,derm,ophthalmology - endocrine,renal,gi  heart catheter angioplasty and drug-eluting stent - 9/15  Orthostatic hypotension - Probably Related to autonomic neuropathy secondary to diabetes  Nasal surgery/septoplasty; Dr. Archuleta Prolona  Cataracts with IOL bilateral  Excision skin cancer scalp,  [address] [employer] with syncopal episode 3/20  SH:  Marital: Legal Status: . retired printer  Personal Habits: Cigarette Use: Negative For current cigarette smoker. Alcohol: Occasionally  consumes alcohol.                Current Outpatient Medications:     HUMALOG MIX 75/25 KWIKPEN (75-25) 100 UNIT/ML SUPN injection pen, INJECT 25 UNITS SC QAM AND 33 UNITS QPM, Disp: , Rfl:     B-D ULTRAFINE III SHORT PEN 31G X 8 MM MISC, USE TID AS DIRECTED, Disp: , Rfl:     INSULIN SYRINGE 1CC/29G 29G X 1/2\" 1 ML MISC, USE TID AS DIRECTED., Disp: , Rfl:     isosorbide mononitrate (IMDUR) 120 MG extended release tablet, TAKE 1/2 TABLET BY MOUTH DAILY, Disp: 45 tablet, Rfl: 1    clopidogrel (PLAVIX) 75 MG tablet, Take 1 tablet by mouth daily, Disp: 90 tablet, Rfl: 1    atorvastatin (LIPITOR) 20 MG tablet, Take 1 tablet by mouth every other day, Disp: 45 tablet, Rfl: 1    metoprolol tartrate (LOPRESSOR) 25 MG tablet, TAKE 1/2 TAB PO BID DAILY, Disp: 90 tablet, Rfl: 1    famotidine (PEPCID) 20 MG tablet, Take 1 tablet by mouth daily, Disp: 90 tablet, Rfl: 1  Allergies   Allergen Reactions    Erythromycin     Penicillins        Past Medical History:   Diagnosis Date    Chronic kidney disease 5/23/2019    Coronary artery disease involving native heart without angina pectoris 5/23/2019    Essential hypertension 5/23/2019    Hyperlipidemia 5/23/2019    Type 2 diabetes mellitus without complication, with long-term current use [facility] 5/23/2019     No past surgical history on file. No family history on file.   Social History     Socioeconomic History    Marital status: Unknown     Spouse name: Neris Gorman Number of children: 2    Years of education: 15    Highest education level: 12th grade   Occupational History    Not on file   Social Needs    Financial resource strain: Very hard    Food insecurity     Worry: Never true     Inability: Never true    Transportation needs     Medical: No     Non-medical: No   Tobacco Use    Smoking status: Never Smoker    Smokeless tobacco: Never Used   Substance and Sexual Activity    Alcohol use: Not on file    Drug use: Not on file    Sexual activity: Not on file   Lifestyle    Physical activity     Days per week: Not on file     Minutes per session: Not on file    Stress: Not on file   Relationships    Social connections     Talks on phone: Not on file     Gets together: Not on file     Attends Confucianism service: Not on file     Active member of club or organization: Not on file     Attends meetings of clubs or organizations: Not on file     Relationship status: Not on file    Intimate partner violence     Fear of current or ex partner: Not on file     Emotionally abused: Not on file     Physically abused: Not on file     Forced sexual activity: Not on file   Other Topics Concern    Not on file   Social History Narrative    Not on file       Vitals:    07/20/20 0837   BP: 112/70   Site: Left Upper Arm   Position: Sitting   Cuff Size: Medium Adult   Pulse: 83   Temp: 97.1 °F (36.2 °C)   TempSrc: Temporal   SpO2: 97%   Weight: 177 lb (80.3 kg)   Height: 5' 11\" (1.803 m)       Physical Exam    Const: Appears well developed and well nourished. No signs of acute distress present. Neck: Supple and symmetric. Palpation reveals no adenopathy. No masses appreciated. Thyroid  exhibits no nodule or thyromegaly. No JVD. Carotids: 2+ and equal bilaterally, without bruits. Resp: No rales, rhonchi or wheezes appreciated over the lungs bilaterally. CV: Rate is regular. Rhythm is regular. S1 is normal. S2 is normal. No gallop or rubs. No heart  murmur appreciated/pulses distally are palpable and okay. Extremities:  no clubbing or  cyanosis/trace  Abdomen: Bowel sounds are normoactive. Palpation reveals softness, with no distension,  organomegaly or tenderness. No abdominal masses palpable. No palpable hepatosplenomegaly. Musculo: Walks with a normal gait.  Upper Extremities: Full ROM bilaterally. Lower Extremities: Full  ROM bilaterally. Psych: Patient's attitude is cooperative. Patient's affect is appropriate. Judgement is realistic. Insight  is appropriate. Neurologically appears to be grossly intact without focal deficits noted.         Assessment and Plan:  Ta Archer was seen today for diabetes. Diagnoses and all orders for this visit:    Coronary artery disease involving native heart without angina pectoris, unspecified vessel or lesion type  Stable on medication and following with cardiology    Essential hypertension  Stable    Gastroesophageal reflux disease without esophagitis    Type 2 diabetes mellitus without complication, with long-term current use of insulin (HCC)  Stable and following with endocrine    Chronic kidney disease, unspecified CKD stage    Hyperlipidemia, unspecified hyperlipidemia type  Stable on statin medication    Plan: I will see him back in 4 months and as needed. Attempt to round up endocrine and renal labs for review. Fall precautions. Continue to monitor blood pressure and blood sugars closely. Follow-up with above consultants as directed. Stay off the losartan for now. He is going to be getting EGD and colonoscopy next week through Dr. Pecola Seip. Will await those results. Notify us of problems in the interim. Return in about 4 months (around 11/20/2020). Seen By:  Jackie Kern MD      *Document was created using voice recognition software. Note was reviewed however may contain grammatical errors.

## 2020-07-22 ENCOUNTER — TELEPHONE (OUTPATIENT)
Dept: FAMILY MEDICINE CLINIC | Age: 85
End: 2020-07-22

## 2020-07-22 NOTE — TELEPHONE ENCOUNTER
David calling in he is scheduled for a colonoscopy and an endoscopy 07/29. He is on plavix. When should he hold and resume rx ?

## 2020-09-04 NOTE — TELEPHONE ENCOUNTER
Last Appointment:  7/20/2020  Future Appointments   Date Time Provider Tatum Cagle   11/23/2020 10:30 AM Arnie Salinas  [address] [facility] [address]

## 2020-11-13 LAB
ALBUMIN SERPL-MCNC: NORMAL G/DL
BUN / CREAT RATIO: NORMAL
BUN BLDV-MCNC: NORMAL MG/DL
CALCIUM SERPL-MCNC: NORMAL MG/DL
CHLORIDE BLD-SCNC: NORMAL MMOL/L
CO2: NORMAL
CREAT SERPL-MCNC: NORMAL MG/DL
GLUCOSE: NORMAL
PHOSPHORUS: NORMAL
POTASSIUM SERPL-SCNC: NORMAL MMOL/L
SODIUM BLD-SCNC: NORMAL MMOL/L

## 2020-11-23 ENCOUNTER — VIRTUAL VISIT (OUTPATIENT)
Dept: FAMILY MEDICINE CLINIC | Age: 85
End: 2020-11-23
Payer: MEDICARE

## 2020-11-23 PROBLEM — I13.0 HYPERTENSIVE HEART AND KIDNEY DISEASE WITH HF AND WITH CKD STAGE I-IV (HCC): Status: ACTIVE | Noted: 2020-11-23

## 2020-11-23 PROCEDURE — G8484 FLU IMMUNIZE NO ADMIN: HCPCS | Performed by: INTERNAL MEDICINE

## 2020-11-23 PROCEDURE — 1036F TOBACCO NON-USER: CPT | Performed by: INTERNAL MEDICINE

## 2020-11-23 PROCEDURE — 99213 OFFICE O/P EST LOW 20 MIN: CPT | Performed by: INTERNAL MEDICINE

## 2020-11-23 PROCEDURE — G8420 CALC BMI NORM PARAMETERS: HCPCS | Performed by: INTERNAL MEDICINE

## 2020-11-23 PROCEDURE — 4040F PNEUMOC VAC/ADMIN/RCVD: CPT | Performed by: INTERNAL MEDICINE

## 2020-11-23 PROCEDURE — 1123F ACP DISCUSS/DSCN MKR DOCD: CPT | Performed by: INTERNAL MEDICINE

## 2020-11-23 PROCEDURE — 3051F HG A1C>EQUAL 7.0%<8.0%: CPT | Performed by: INTERNAL MEDICINE

## 2020-11-23 PROCEDURE — G8427 DOCREV CUR MEDS BY ELIG CLIN: HCPCS | Performed by: INTERNAL MEDICINE

## 2020-11-23 RX ORDER — NITROGLYCERIN 0.3 MG/1
1 TABLET SUBLINGUAL PRN
COMMUNITY

## 2020-11-23 RX ORDER — FAMOTIDINE 20 MG/1
20 TABLET, FILM COATED ORAL DAILY
Qty: 90 TABLET | Refills: 1 | Status: SHIPPED
Start: 2020-11-23 | End: 2021-11-24 | Stop reason: ALTCHOICE

## 2020-11-23 RX ORDER — ISOSORBIDE MONONITRATE 60 MG/1
30 TABLET, EXTENDED RELEASE ORAL DAILY
COMMUNITY
End: 2021-11-24

## 2020-11-23 RX ORDER — CLOPIDOGREL BISULFATE 75 MG/1
75 TABLET ORAL DAILY
Qty: 90 TABLET | Refills: 1 | Status: SHIPPED
Start: 2020-11-23 | End: 2021-07-22 | Stop reason: SDUPTHER

## 2020-11-23 RX ORDER — LOSARTAN POTASSIUM 25 MG/1
1 TABLET ORAL DAILY
COMMUNITY
End: 2021-07-22 | Stop reason: ALTCHOICE

## 2020-11-23 ASSESSMENT — PATIENT HEALTH QUESTIONNAIRE - PHQ9
2. FEELING DOWN, DEPRESSED OR HOPELESS: 0
SUM OF ALL RESPONSES TO PHQ QUESTIONS 1-9: 0
SUM OF ALL RESPONSES TO PHQ9 QUESTIONS 1 & 2: 0
1. LITTLE INTEREST OR PLEASURE IN DOING THINGS: 0

## 2020-11-24 NOTE — PROGRESS NOTES
TeleMedicine Video Visit    This visit was performed as a virtual video visit using a synchronous, two-way, audio-video telehealth technology platform. Patient identification was verified at the start of the visit, including the patient's telephone number and physical location. I discussed with the patient the nature of our telehealth visits, that:     1. Due to the nature of an audio- video modality, the only components of a physical exam that could be done are the elements supported by direct observation. 2. I would evaluate the patient and recommend diagnostics and treatments based on my assessment. 3. If it was felt that the patient should be evaluated in clinic or an emergency room setting, then they would be directed there. 4. Our sessions are not being recorded and that personal health information is protected. 5. Our team would provide follow up care in person if/when the patient needs it. Patient does agree to proceed with telemedicine consultation. Patient's location: home address in [facility]  Physician  location Office address in [place] other people involved in call--- wife      Time spent: Greater than Not billed by time    This visit was completed virtually using [facility]     20  Joseph IBARRA Covert : 1935 Sex: male  Age: 82 y.o. Chief Complaint   Patient presents with    Diabetes    Coronary Artery Disease       HPI  Patient encounter today via virtual video visit secondary to ongoing COVID-19 pandemic status. He is accompanied by his wife for the visit. This 4-month visit. He tells me he has had no further syncopal episodes since the last 1 prior to last visit. And this was felt to be related to nitroglycerin and getting up quickly. He was restarted on the losartan and blood pressures he tells me have been good. Blood sugars have also been good. He did have a few low ones in his insulin dose was decreased slightly by endocrine.   Lipids have been stable. He has had no falls. I did review the consultant notes that were available to me. He does state that he just had blood work done a couple days ago in [facility] through renal.  Those results are still pending to me but I will attempt to retrieve them to review. I told him I did want to add lipids and hemoglobin A1c which I know have not been done recently. Patient does want this done at the hospital and we will give him the order. No further chest discomfort since we have seen him last.  He is up-to-date with all of his consultants including renal, endocrine, dermatology, cardiology, ophthalmology     He is also had EGD and colonoscopy done since I saw him last.  And I did review this with him. Review of Systems     Const: Reports fatigue (improved), but denies chills, fever and sweats. CV:  denies  orthopnea and palpitations.    Resp: Denies cough, SOB and wheezing. GI: Denies diarrhea, nausea and vomiting. Musculo: Reports arthritis, neck pain and back pain/neck pain improved  Neuro: Reports dizziness but denies headache, numbness/tingling, seizures, syncope and weakness/dizziness-improved --occurs when he moves or gets up quickly. Endocrine: Reports diabetes. -See above  Hema/Lymph: Denies easy bleeding, excessive bleeding, postsurgical bleeding and bleeding/clotting  disorder. REST OF PERTINENT ROS GONE OVER AND WAS NEGATIVE.    PMH:  Problem List: Athscl heart disease of native coronary artery w/o ang pctrs, Essential hypertension,  Disorder of kidney and/or ureter, Proteinuria, Type 2 diabetes mellitus, Hyperlipidemia, Coronary  arteriosclerosis, Benign essential hypertension  Health Maintenance:  Influenza Vaccination - (10/11/2018)  Tetanus Immunization - (2018)  Couseled on Home Safety - (2018)  Colonoscopy - (10/11/2016), -no further recommended  Bone Density Test Screening - (2005)  Joseph IBARRA Covert  1935 Page #2  Stress Test - ,10/11,1/15-neg, 10/16-neg,8/17-neg,2/19-neg  Prostate Exam - 1/09,7/10,8/11,11/12,5/14,10/15, 9/18,8/19  PSA Test - \",7/10,8/11,11/12,5/14, 2/15,11/15,11/16,9/18,8/19  Rectal Exam - \",7/10,8/11,11/12,5/14,10/15, 9/18,8/19  Hemmocult Cards - neg x 3-2/12,3/12  EGD - 6/12,12/12,10/16, 7/20-no further recommended  2D ECHO - 10/11, 2/19  capsule endoscop - 7/12  EKG - 7/13,9/13  heart cath - 9/13,9/15  Prevnar Vaccine - (9/2016)  Pneumonia Vaccination - (9/2009)  Zoster/Shingles Vaccine - given @ [facility]  Medical Problems:  Ischemic Cardiomyopathy, Renal Insufficiency, Proteinuria  Small Bowel Obstruction - 11/05  Non Insulin Dependent Diabetes  Coronary Artery Disease (CAD) - angioplasty with stent placement  Erythromycin Induced Hepatitis, Kidney Stones, Cervical Disc Surg/DR Mallory Chacon, Sleep Apnea  positive lupus anticoagulant - prolonged ptt-saw heme/onc  CKD, Colon Polyps, Gastritis, helicobacter-treated, duodenal polyp, rotator cuff tear, Gastroesophageal  Reflux Disease (GERD), Hypertension, Hyperlipidemia, Diabetic Retinopathy  sees cardiology,derm,ophthalmology - endocrine,renal,gi  heart catheter angioplasty and drug-eluting stent - 9/15  Orthostatic hypotension - Probably Related to autonomic neuropathy secondary to diabetes  Nasal surgery/septoplasty; Dr. Debi Belle  Cataracts with IOL bilateral  Excision skin cancer scalp, Dr. Gabriel Gallagher  Hospitalized with syncopal episode 3/20  SH:  Marital: Legal Status: . retired printer  Personal Habits: Cigarette Use: Negative For current cigarette smoker. Alcohol: Occasionally  consumes alcohol.                      Current Outpatient Medications:     isosorbide mononitrate (IMDUR) 60 MG extended release tablet, Take 30 mg by mouth daily, Disp: , Rfl:     Aspirin Buf,CaCarb-MgCarb-MgO, 81 MG TABS, Take 81 mg by mouth daily, Disp: , Rfl:     nitroGLYCERIN (NITROSTAT) 0.3 MG SL tablet, 1 tablet as needed, Disp: , Rfl:     clopidogrel (PLAVIX) 75 MG tablet, Take 1 tablet by mouth daily, Disp: 90 tablet, Rfl: 1    famotidine (PEPCID) 20 MG tablet, Take 1 tablet by mouth daily, Disp: 90 tablet, Rfl: 1    atorvastatin (LIPITOR) 20 MG tablet, TAKE 1 TABLET BY MOUTH EVERY OTHER DAY, Disp: 45 tablet, Rfl: 1    metoprolol tartrate (LOPRESSOR) 25 MG tablet, TAKE 1/2 TABLET BY MOUTH TWICE DAILY, Disp: 90 tablet, Rfl: 1    HUMALOG MIX 75/25 KWIKPEN (75-25) 100 UNIT/ML SUPN injection pen, INJECT 25 UNITS SC QAM AND 33 UNITS QPM, Disp: , Rfl:     B-D ULTRAFINE III SHORT PEN 31G X 8 MM MISC, USE TID AS DIRECTED, Disp: , Rfl:     INSULIN SYRINGE 1CC/29G 29G X 1/2\" 1 ML MISC, USE TID AS DIRECTED., Disp: , Rfl:     losartan (COZAAR) 25 MG tablet, Take 1 tablet by mouth daily, Disp: , Rfl:   Allergies   Allergen Reactions    Erythromycin     Penicillins        Past Medical History:   Diagnosis Date    Chronic kidney disease 5/23/2019    Coronary artery disease involving native heart without angina pectoris 5/23/2019    Essential hypertension 5/23/2019    Hyperlipidemia 5/23/2019    Type 2 diabetes mellitus without complication, with long-term current use of insulin ([facility] [address] 5/23/2019     No past surgical history on file. No family history on file.   Social History     Socioeconomic History    Marital status: Unknown     Spouse name: Elba Ferrera Number of children: 2    Years of education: 15    Highest education level: 12th grade   Occupational History    Not on file   Social Needs    Financial resource strain: Very hard    Food insecurity     Worry: Never true     Inability: Never true    Transportation needs     Medical: No     Non-medical: No   Tobacco Use    Smoking status: Never Smoker    Smokeless tobacco: Never Used   Substance and Sexual Activity    Alcohol use: Not on file    Drug use: Not on file    Sexual activity: Not on file   Lifestyle    Physical activity     Days per week: Not on file     Minutes per session: Not on file    Stress: Not on file   Relationships    Social connections     Talks on phone: Not on file     Gets together: Not on file     Attends Pentecostalism service: Not on file     Active member of club or organization: Not on file     Attends meetings of clubs or organizations: Not on file     Relationship status: Not on file    Intimate partner violence     Fear of current or ex partner: Not on file     Emotionally abused: Not on file     Physically abused: Not on file     Forced sexual activity: Not on file   Other Topics Concern    Not on file   Social History Narrative    Not on file       There were no vitals filed for this visit. Physical Exam  Constitutional:       General: He is not in acute distress. HENT:      Head: Normocephalic and atraumatic. Pulmonary:      Effort: Pulmonary effort is normal.   Musculoskeletal: Normal range of motion. Neurological:      Mental Status: He is alert and oriented to person, place, and time. Psychiatric:         Mood and Affect: Mood normal.         Behavior: Behavior normal.         Thought Content: Thought content normal.         Judgment: Judgment normal.               Assessment and Plan:  Anabel Holder was seen today for diabetes and coronary artery disease. 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 Fall precautions. Fasting blood work upcoming at the hospital.  Will attempt to retrieve renal blood work done recently. Continue to monitor blood pressure and blood sugar closely. Follow-up with above consultants. Notify us with problems in the interim. Return in about 4 months (around 3/23/2021). Seen By:  Kary Middleton MD      *Document was created using voice recognition software. Note was reviewed however may contain grammatical errors.

## 2021-01-19 DIAGNOSIS — I25.10 CORONARY ARTERY DISEASE INVOLVING NATIVE HEART WITHOUT ANGINA PECTORIS, UNSPECIFIED VESSEL OR LESION TYPE: ICD-10-CM

## 2021-01-19 RX ORDER — ATORVASTATIN CALCIUM 20 MG/1
20 TABLET, FILM COATED ORAL EVERY OTHER DAY
Qty: 45 TABLET | Refills: 2 | Status: SHIPPED
Start: 2021-01-19 | End: 2021-07-22 | Stop reason: SDUPTHER

## 2021-01-19 NOTE — TELEPHONE ENCOUNTER
Last Appointment:  11/23/2020  Future Appointments   Date Time Provider Tatum Cagle   3/22/2021 10:00 AM Sandra Ferris  [address]

## 2021-03-22 ENCOUNTER — OFFICE VISIT (OUTPATIENT)
Dept: FAMILY MEDICINE CLINIC | Age: 86
End: 2021-03-22
Payer: MEDICARE

## 2021-03-22 VITALS
HEIGHT: 69 IN | TEMPERATURE: 97.2 F | DIASTOLIC BLOOD PRESSURE: 72 MMHG | WEIGHT: 173.2 LBS | BODY MASS INDEX: 25.65 KG/M2 | HEART RATE: 76 BPM | SYSTOLIC BLOOD PRESSURE: 118 MMHG | OXYGEN SATURATION: 97 %

## 2021-03-22 DIAGNOSIS — E11.9 TYPE 2 DIABETES MELLITUS WITHOUT COMPLICATION, WITH LONG-TERM CURRENT USE OF INSULIN (HCC): ICD-10-CM

## 2021-03-22 DIAGNOSIS — I25.10 CORONARY ARTERY DISEASE INVOLVING NATIVE HEART WITHOUT ANGINA PECTORIS, UNSPECIFIED VESSEL OR LESION TYPE: ICD-10-CM

## 2021-03-22 DIAGNOSIS — I10 ESSENTIAL HYPERTENSION: ICD-10-CM

## 2021-03-22 DIAGNOSIS — E78.5 HYPERLIPIDEMIA, UNSPECIFIED HYPERLIPIDEMIA TYPE: ICD-10-CM

## 2021-03-22 DIAGNOSIS — N18.9 CHRONIC KIDNEY DISEASE, UNSPECIFIED CKD STAGE: ICD-10-CM

## 2021-03-22 DIAGNOSIS — Z79.4 TYPE 2 DIABETES MELLITUS WITHOUT COMPLICATION, WITH LONG-TERM CURRENT USE OF INSULIN (HCC): ICD-10-CM

## 2021-03-22 PROCEDURE — 99214 OFFICE O/P EST MOD 30 MIN: CPT | Performed by: INTERNAL MEDICINE

## 2021-03-22 PROCEDURE — 4040F PNEUMOC VAC/ADMIN/RCVD: CPT | Performed by: INTERNAL MEDICINE

## 2021-03-22 PROCEDURE — G8419 CALC BMI OUT NRM PARAM NOF/U: HCPCS | Performed by: INTERNAL MEDICINE

## 2021-03-22 PROCEDURE — 1123F ACP DISCUSS/DSCN MKR DOCD: CPT | Performed by: INTERNAL MEDICINE

## 2021-03-22 PROCEDURE — 1036F TOBACCO NON-USER: CPT | Performed by: INTERNAL MEDICINE

## 2021-03-22 PROCEDURE — G8427 DOCREV CUR MEDS BY ELIG CLIN: HCPCS | Performed by: INTERNAL MEDICINE

## 2021-03-22 PROCEDURE — G8484 FLU IMMUNIZE NO ADMIN: HCPCS | Performed by: INTERNAL MEDICINE

## 2021-03-22 ASSESSMENT — PATIENT HEALTH QUESTIONNAIRE - PHQ9
SUM OF ALL RESPONSES TO PHQ QUESTIONS 1-9: 0
SUM OF ALL RESPONSES TO PHQ QUESTIONS 1-9: 0
2. FEELING DOWN, DEPRESSED OR HOPELESS: 0

## 2021-03-23 DIAGNOSIS — I10 ESSENTIAL HYPERTENSION: ICD-10-CM

## 2021-03-23 DIAGNOSIS — E78.5 HYPERLIPIDEMIA, UNSPECIFIED HYPERLIPIDEMIA TYPE: ICD-10-CM

## 2021-03-23 LAB
BASOPHILS ABSOLUTE: 0.05 E9/L (ref 0–0.2)
BASOPHILS RELATIVE PERCENT: 0.8 % (ref 0–2)
CHOLESTEROL, TOTAL: 141 MG/DL (ref 0–199)
EOSINOPHILS ABSOLUTE: 0.29 E9/L (ref 0.05–0.5)
EOSINOPHILS RELATIVE PERCENT: 4.7 % (ref 0–6)
HCT VFR BLD CALC: 43.5 % (ref 37–54)
HDLC SERPL-MCNC: 50 MG/DL
HEMOGLOBIN: 13.2 G/DL (ref 12.5–16.5)
IMMATURE GRANULOCYTES #: 0.02 E9/L
IMMATURE GRANULOCYTES %: 0.3 % (ref 0–5)
LDL CHOLESTEROL CALCULATED: 78 MG/DL (ref 0–99)
LYMPHOCYTES ABSOLUTE: 2.2 E9/L (ref 1.5–4)
LYMPHOCYTES RELATIVE PERCENT: 35.7 % (ref 20–42)
MCH RBC QN AUTO: 31.1 PG (ref 26–35)
MCHC RBC AUTO-ENTMCNC: 30.3 % (ref 32–34.5)
MCV RBC AUTO: 102.4 FL (ref 80–99.9)
MONOCYTES ABSOLUTE: 0.7 E9/L (ref 0.1–0.95)
MONOCYTES RELATIVE PERCENT: 11.4 % (ref 2–12)
NEUTROPHILS ABSOLUTE: 2.9 E9/L (ref 1.8–7.3)
NEUTROPHILS RELATIVE PERCENT: 47.1 % (ref 43–80)
PDW BLD-RTO: 13.3 FL (ref 11.5–15)
PLATELET # BLD: 234 E9/L (ref 130–450)
PMV BLD AUTO: 9.5 FL (ref 7–12)
RBC # BLD: 4.25 E12/L (ref 3.8–5.8)
TRIGL SERPL-MCNC: 65 MG/DL (ref 0–149)
VLDLC SERPL CALC-MCNC: 13 MG/DL
WBC # BLD: 6.2 E9/L (ref 4.5–11.5)

## 2021-03-24 ENCOUNTER — TELEPHONE (OUTPATIENT)
Dept: FAMILY MEDICINE CLINIC | Age: 86
End: 2021-03-24

## 2021-03-24 NOTE — TELEPHONE ENCOUNTER
Labs stable. Please put in note field to check vitamin B12 and TSH related to macrocytosis when I see patient next.

## 2021-03-24 NOTE — TELEPHONE ENCOUNTER
Letter sent to patient letting him know his labs came back stable. Attached a copy of the results for his records. Also added b12 & tsh to next appointment note.

## 2021-06-18 LAB
ALBUMIN SERPL-MCNC: NORMAL G/DL
ALP BLD-CCNC: NORMAL U/L
ALT SERPL-CCNC: NORMAL U/L
ANION GAP SERPL CALCULATED.3IONS-SCNC: NORMAL MMOL/L
AST SERPL-CCNC: NORMAL U/L
BASOPHILS ABSOLUTE: 0 /ΜL
BASOPHILS RELATIVE PERCENT: 0.5 %
BILIRUB SERPL-MCNC: NORMAL MG/DL
BUN BLDV-MCNC: NORMAL MG/DL
CALCIUM SERPL-MCNC: NORMAL MG/DL
CHLORIDE BLD-SCNC: NORMAL MMOL/L
CHOLESTEROL, TOTAL: 152 MG/DL
CHOLESTEROL/HDL RATIO: 1.6
CO2: NORMAL
CREAT SERPL-MCNC: NORMAL MG/DL
CREATININE, URINE: NORMAL
EOSINOPHILS ABSOLUTE: 0.1 /ΜL
EOSINOPHILS RELATIVE PERCENT: 1.3 %
GFR CALCULATED: NORMAL
GLUCOSE BLD-MCNC: NORMAL MG/DL
HCT VFR BLD CALC: 39.7 % (ref 41–53)
HDLC SERPL-MCNC: 51 MG/DL (ref 35–70)
HEMOGLOBIN: 13.7 G/DL (ref 13.5–17.5)
LDL CHOLESTEROL CALCULATED: 83 MG/DL (ref 0–160)
LYMPHOCYTES ABSOLUTE: 1.4 /ΜL
LYMPHOCYTES RELATIVE PERCENT: 19.9 %
MCH RBC QN AUTO: 32.8 PG
MCHC RBC AUTO-ENTMCNC: 34.5 G/DL
MCV RBC AUTO: 95 FL
MICROALBUMIN/CREAT 24H UR: NORMAL MG/G{CREAT}
MICROALBUMIN/CREAT UR-RTO: NORMAL
MONOCYTES ABSOLUTE: 0.7 /ΜL
MONOCYTES RELATIVE PERCENT: 9.2 %
NEUTROPHILS ABSOLUTE: 4.9 /ΜL
NEUTROPHILS RELATIVE PERCENT: 69.1 %
NONHDLC SERPL-MCNC: NORMAL MG/DL
PDW BLD-RTO: 13.2 %
PLATELET # BLD: 228 K/ΜL
PMV BLD AUTO: 6.9 FL
POTASSIUM SERPL-SCNC: NORMAL MMOL/L
RBC # BLD: 4.18 10^6/ΜL
SODIUM BLD-SCNC: NORMAL MMOL/L
TOTAL PROTEIN: NORMAL
TRIGL SERPL-MCNC: 56 MG/DL
VLDLC SERPL CALC-MCNC: NORMAL MG/DL
WBC # BLD: 7.1 10^3/ML

## 2021-06-28 LAB
AVERAGE GLUCOSE: 140
HBA1C MFR BLD: 6.5 %

## 2021-07-22 ENCOUNTER — OFFICE VISIT (OUTPATIENT)
Dept: FAMILY MEDICINE CLINIC | Age: 86
End: 2021-07-22
Payer: MEDICARE

## 2021-07-22 VITALS
BODY MASS INDEX: 25.36 KG/M2 | TEMPERATURE: 97 F | DIASTOLIC BLOOD PRESSURE: 62 MMHG | HEART RATE: 79 BPM | SYSTOLIC BLOOD PRESSURE: 108 MMHG | OXYGEN SATURATION: 96 % | WEIGHT: 171.2 LBS | HEIGHT: 69 IN

## 2021-07-22 DIAGNOSIS — N18.30 TYPE 2 DIABETES MELLITUS WITH STAGE 3 CHRONIC KIDNEY DISEASE, WITH LONG-TERM CURRENT USE OF INSULIN, UNSPECIFIED WHETHER STAGE 3A OR 3B CKD (HCC): ICD-10-CM

## 2021-07-22 DIAGNOSIS — I10 ESSENTIAL HYPERTENSION: Primary | ICD-10-CM

## 2021-07-22 DIAGNOSIS — K21.9 GASTROESOPHAGEAL REFLUX DISEASE WITHOUT ESOPHAGITIS: ICD-10-CM

## 2021-07-22 DIAGNOSIS — I25.10 CORONARY ARTERY DISEASE INVOLVING NATIVE HEART WITHOUT ANGINA PECTORIS, UNSPECIFIED VESSEL OR LESION TYPE: ICD-10-CM

## 2021-07-22 DIAGNOSIS — E11.22 TYPE 2 DIABETES MELLITUS WITH STAGE 3 CHRONIC KIDNEY DISEASE, WITH LONG-TERM CURRENT USE OF INSULIN, UNSPECIFIED WHETHER STAGE 3A OR 3B CKD (HCC): ICD-10-CM

## 2021-07-22 DIAGNOSIS — I13.0 HYPERTENSIVE HEART AND KIDNEY DISEASE WITH HF AND WITH CKD STAGE I-IV (HCC): ICD-10-CM

## 2021-07-22 DIAGNOSIS — Z79.4 TYPE 2 DIABETES MELLITUS WITH STAGE 3 CHRONIC KIDNEY DISEASE, WITH LONG-TERM CURRENT USE OF INSULIN, UNSPECIFIED WHETHER STAGE 3A OR 3B CKD (HCC): ICD-10-CM

## 2021-07-22 PROCEDURE — 4040F PNEUMOC VAC/ADMIN/RCVD: CPT | Performed by: INTERNAL MEDICINE

## 2021-07-22 PROCEDURE — 1123F ACP DISCUSS/DSCN MKR DOCD: CPT | Performed by: INTERNAL MEDICINE

## 2021-07-22 PROCEDURE — 1036F TOBACCO NON-USER: CPT | Performed by: INTERNAL MEDICINE

## 2021-07-22 PROCEDURE — 99214 OFFICE O/P EST MOD 30 MIN: CPT | Performed by: INTERNAL MEDICINE

## 2021-07-22 PROCEDURE — G8427 DOCREV CUR MEDS BY ELIG CLIN: HCPCS | Performed by: INTERNAL MEDICINE

## 2021-07-22 PROCEDURE — G8417 CALC BMI ABV UP PARAM F/U: HCPCS | Performed by: INTERNAL MEDICINE

## 2021-07-22 RX ORDER — ATORVASTATIN CALCIUM 20 MG/1
20 TABLET, FILM COATED ORAL EVERY OTHER DAY
Qty: 45 TABLET | Refills: 1 | Status: SHIPPED
Start: 2021-07-22 | End: 2021-11-24 | Stop reason: SDUPTHER

## 2021-07-22 RX ORDER — CLOPIDOGREL BISULFATE 75 MG/1
75 TABLET ORAL DAILY
Qty: 90 TABLET | Refills: 1 | Status: SHIPPED
Start: 2021-07-22 | End: 2021-11-24 | Stop reason: SDUPTHER

## 2021-07-22 RX ORDER — FAMOTIDINE 20 MG/1
20 TABLET, FILM COATED ORAL DAILY
Qty: 90 TABLET | Refills: 1 | Status: CANCELLED | OUTPATIENT
Start: 2021-07-22

## 2021-07-22 RX ORDER — PANTOPRAZOLE SODIUM 20 MG/1
20 TABLET, DELAYED RELEASE ORAL
Qty: 90 TABLET | Refills: 1 | Status: SHIPPED | OUTPATIENT
Start: 2021-07-22 | End: 2021-11-24 | Stop reason: SDUPTHER

## 2021-07-22 SDOH — ECONOMIC STABILITY: FOOD INSECURITY: WITHIN THE PAST 12 MONTHS, THE FOOD YOU BOUGHT JUST DIDN'T LAST AND YOU DIDN'T HAVE MONEY TO GET MORE.: NEVER TRUE

## 2021-07-22 SDOH — ECONOMIC STABILITY: FOOD INSECURITY: WITHIN THE PAST 12 MONTHS, YOU WORRIED THAT YOUR FOOD WOULD RUN OUT BEFORE YOU GOT MONEY TO BUY MORE.: NEVER TRUE

## 2021-07-22 ASSESSMENT — SOCIAL DETERMINANTS OF HEALTH (SDOH): HOW HARD IS IT FOR YOU TO PAY FOR THE VERY BASICS LIKE FOOD, HOUSING, MEDICAL CARE, AND HEATING?: NOT HARD AT ALL

## 2021-07-22 NOTE — PROGRESS NOTES
[address] [facility] [address] [facility]     21  Joseph IBARRA Covert : 1935 Sex: male  Age: 82 y.o. Chief Complaint   Patient presents with    Coronary Artery Disease     4 month follow-up; chk b12 & tsh    Hypertension    Diabetes       HPI    George Monica presents today accompanied by his wife for 4-month follow-up visit on his multiple medical problems. Since have seen him he is seeing endocrine with his diabetes and renal for his chronic kidney disease. I did review both of their notes. He states blood work has been drawn but I do not have all of the results in front of me. I did look over when I saw with renal and hemoglobin A1c of 6.5 from endocrine but I will try to get all the rest of the labs. He states he had his lipids done to which I do not see. I told him if they did not get all these labs I would need to repeat a few things here. His CAD has been stable and following with cardiology. Hypertension has been okay. Tells me blood sugars have been good. His GERD has not been well controlled on his current Pepcid. We did take him off omeprazole because of the concomitant use of Plavix. I told him I was going to stop the Pepcid and place him on Protonix as a trial as he did do better on a PPI. And this should be more compatible with the Plavix. Still gets occasional slight dizzy spells/brain fog but states he has gotten used to this and is not having any major issues and no falls. Does seem to be worse when he gets up too quickly. I did tell him he needs to move more slowly and deliberately to try and avoid this. He is up-to-date with all of his consultants including renal, endocrine, dermatology, cardiology, ophthalmology  Review of Systems     Const: Reports fatigue (improved), but denies chills, fever and sweats. CV:  denies  orthopnea and palpitations.    Resp: Denies cough, SOB and wheezing. GI: Denies diarrhea, nausea and vomiting.   Musculo: Reports arthritis, neck pain and back pain/neck pain improved  Neuro: Reports dizziness but denies headache, numbness/tingling, seizures, syncope and weakness/dizziness-improved --occurs when he moves or gets up quickly. Endocrine: Reports diabetes. -See above  Hema/Lymph: Denies easy bleeding, excessive bleeding, postsurgical bleeding and bleeding/clotting  disorder.         REST OF PERTINENT ROS GONE OVER AND WAS NEGATIVE.      PMH:  Problem List: Athscl heart disease of native coronary artery w/o ang pctrs, Essential hypertension,  Disorder of kidney and/or ureter, Proteinuria, Type 2 diabetes mellitus, Hyperlipidemia, Coronary  arteriosclerosis, Benign essential hypertension  Health Maintenance:  Influenza Vaccination - (10/11/2018)  Tetanus Immunization - (2018)  Couseled on Home Safety - (2018)  Colonoscopy - (10/11/2016), -no further recommended  Bone Density Test Screening - (2005)  Joseph IBARRA Covert  1935 Page #2  Stress Test - ,10/11,1/15-neg, 10/16-neg,-neg,-neg  Prostate Exam - ,7/10,,,,10/15, ,  PSA Test - \",7/10,,,, 2/15,11/15,,,  Rectal Exam - \",7/10,,,,10/15, ,  Hemmocult Cards - neg x 3-,3/12  EGD - ,,10/16, -no further recommended  2D ECHO - 10/11,   capsule endoscop -   EKG - ,  heart cath - ,9/15  Prevnar Vaccine - (2016)  Pneumonia Vaccination - (2009)  Zoster/Shingles Vaccine - given @ [facility]  Medical Problems:  Ischemic Cardiomyopathy, Renal Insufficiency, Proteinuria  Small Bowel Obstruction -   Non Insulin Dependent Diabetes  Coronary Artery Disease (CAD) - angioplasty with stent placement  Erythromycin Induced Hepatitis, Kidney Stones, Cervical Disc Surg/DR Jaswant Gill, Sleep Apnea  positive lupus anticoagulant - prolonged ptt-saw heme/onc  CKD, Colon Polyps, Gastritis, helicobacter-treated, duodenal polyp, rotator cuff tear, Gastroesophageal  Reflux Disease (GERD), Hypertension, Hyperlipidemia, Diabetic Retinopathy  sees cardiology,derm,ophthalmology - endocrine,renal,gi  heart catheter angioplasty and drug-eluting stent - 9/15  Orthostatic hypotension - Probably Related to autonomic neuropathy secondary to diabetes  Nasal surgery/septoplasty; Dr. Giovani Davey with IOL bilateral  Excision skin cancer scalp, Dr. Abdoul Fry with syncopal episode 3/20     SH: Marital: Legal Status: . retired printer  Personal Habits: Cigarette Use: Negative For current cigarette smoker. Alcohol: Occasionally  consumes alcohol.              Current Outpatient Medications:     metoprolol tartrate (LOPRESSOR) 25 MG tablet, TAKE 1/2 TABLET BY MOUTH TWICE DAILY, Disp: 90 tablet, Rfl: 1    clopidogrel (PLAVIX) 75 MG tablet, Take 1 tablet by mouth daily, Disp: 90 tablet, Rfl: 1    atorvastatin (LIPITOR) 20 MG tablet, Take 1 tablet by mouth every other day, Disp: 45 tablet, Rfl: 1    pantoprazole (PROTONIX) 20 MG tablet, Take 1 tablet by mouth every morning (before breakfast), Disp: 90 tablet, Rfl: 1    isosorbide mononitrate (IMDUR) 60 MG extended release tablet, Take 30 mg by mouth daily, Disp: , Rfl:     Aspirin Buf,CaCarb-MgCarb-MgO, 81 MG TABS, Take 81 mg by mouth daily, Disp: , Rfl:     nitroGLYCERIN (NITROSTAT) 0.3 MG SL tablet, 1 tablet as needed, Disp: , Rfl:     famotidine (PEPCID) 20 MG tablet, Take 1 tablet by mouth daily, Disp: 90 tablet, Rfl: 1    HUMALOG MIX 75/25 KWIKPEN (75-25) 100 UNIT/ML SUPN injection pen, 20 units am, 18 units pm, Disp: , Rfl:     B-D ULTRAFINE III SHORT PEN 31G X 8 MM MISC, USE TID AS DIRECTED, Disp: , Rfl:     INSULIN SYRINGE 1CC/29G 29G X 1/2\" 1 ML MISC, USE TID AS DIRECTED., Disp: , Rfl:   Allergies   Allergen Reactions    Erythromycin     Penicillins        Past Medical History:   Diagnosis Date    Chronic kidney disease 5/23/2019    Coronary artery disease involving native heart without angina pectoris 5/23/2019    Essential hypertension 5/23/2019    Hyperlipidemia 5/23/2019    Type 2 diabetes mellitus without complication, with long-term current use of insulin ([facility] [address] 5/23/2019     No past surgical history on file. No family history on file. Social History     Socioeconomic History    Marital status: Unknown     Spouse name: Carmelina Roque Number of children: 2    Years of education: 15    Highest education level: 12th grade   Occupational History    Not on file   Tobacco Use    Smoking status: Never Smoker    Smokeless tobacco: Never Used   Substance and Sexual Activity    Alcohol use: Not on file    Drug use: Not on file    Sexual activity: Not on file   Other Topics Concern    Not on file   Social History Narrative    Not on file     Social Determinants of Health     Financial Resource Strain: Low Risk     Difficulty of Paying Living Expenses: Not hard at all   Food Insecurity: No Food Insecurity    Worried About [address] in the Last Year: Never true    [address] in the Last Year: Never true   Transportation Needs:     Lack of Transportation (Medical):  Lack of Transportation (Non-Medical):    Physical Activity:     Days of Exercise per Week:     Minutes of Exercise per Session:    Stress:     Feeling of Stress :    Social Connections:     Frequency of Communication with Friends and Family:     Frequency of Social Gatherings with Friends and Family:     Attends Mormon Services:     Active Member of Clubs or Organizations:     Attends Club or Organization Meetings:     Marital Status:    Intimate Partner Violence:     Fear of Current or Ex-Partner:     Emotionally Abused:     Physically Abused:     Sexually Abused:        Vitals:    07/22/21 1005   BP: 108/62   Pulse: 79   Temp: 97 °F (36.1 °C)   TempSrc: Temporal   SpO2: 96%   Weight: 171 lb 3.2 oz (77.7 kg)   Height: 5' 9\" (1.753 m)       Physical Exam    Const: Appears well developed and well nourished. No signs of acute distress present. Somewhat frail-appearing  Neck: Supple and symmetric. Palpation reveals no adenopathy. No masses appreciated. Thyroid  exhibits no nodule or thyromegaly. No JVD. Carotids: 2+ and equal bilaterally, without bruits. Resp: No rales, rhonchi or wheezes appreciated over the lungs bilaterally. CV: Rate is regular. Rhythm is regular. S1 is normal. S2 is normal. No gallop or rubs. No heart  murmur appreciated/pulses distally are palpable and okay. Extremities:  no clubbing or  cyanosis/trace  Abdomen: Bowel sounds are normoactive. Palpation reveals softness, with no distension,  organomegaly or tenderness. No abdominal masses palpable. No palpable hepatosplenomegaly. Musculo: Walks with a normal gait. Upper Extremities: Full ROM bilaterally. Lower Extremities: Full  ROM bilaterally. Psych: Patient's attitude is cooperative. Patient's affect is appropriate. Judgement is realistic. Insight  is appropriate. Neurologically appears to be grossly intact without focal deficits noted.           Assessment and Plan:  Stoney Roes was seen today for coronary artery disease, hypertension and diabetes. Diagnoses and all orders for this visit:    Essential hypertension  -     metoprolol tartrate (LOPRESSOR) 25 MG tablet; TAKE 1/2 TABLET BY MOUTH TWICE DAILY  Stable    Gastroesophageal reflux disease without esophagitis  Not well controlled. 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 Start Pepcid and start Protonix. Prescription management performed. Will attempt to get blood work and review. Continue to follow with above consultants. Fall precautions. I did ask him to bring his machine in for comparison purposes. Encounter time including face-to-face as well as post visit chart review and E HR documentation retrieval and review of labs as well as reviewing consultative notes was 35 minutes. Return in about 4 months (around 11/22/2021). Seen By:  Arianne Vincent MD      *Document was created using voice recognition software. Note was reviewed however may contain grammatical errors.

## 2021-07-28 DIAGNOSIS — Z79.4 TYPE 2 DIABETES MELLITUS WITHOUT COMPLICATION, WITH LONG-TERM CURRENT USE OF INSULIN (HCC): ICD-10-CM

## 2021-07-28 DIAGNOSIS — E11.9 TYPE 2 DIABETES MELLITUS WITHOUT COMPLICATION, WITH LONG-TERM CURRENT USE OF INSULIN (HCC): ICD-10-CM

## 2021-11-24 ENCOUNTER — OFFICE VISIT (OUTPATIENT)
Dept: FAMILY MEDICINE CLINIC | Age: 86
End: 2021-11-24
Payer: MEDICARE

## 2021-11-24 VITALS
DIASTOLIC BLOOD PRESSURE: 64 MMHG | TEMPERATURE: 97.3 F | OXYGEN SATURATION: 97 % | BODY MASS INDEX: 25.36 KG/M2 | WEIGHT: 171.2 LBS | HEIGHT: 69 IN | SYSTOLIC BLOOD PRESSURE: 112 MMHG | HEART RATE: 83 BPM

## 2021-11-24 DIAGNOSIS — N18.9 CHRONIC KIDNEY DISEASE, UNSPECIFIED CKD STAGE: ICD-10-CM

## 2021-11-24 DIAGNOSIS — K21.9 GASTROESOPHAGEAL REFLUX DISEASE WITHOUT ESOPHAGITIS: ICD-10-CM

## 2021-11-24 DIAGNOSIS — Z79.4 TYPE 2 DIABETES MELLITUS WITHOUT COMPLICATION, WITH LONG-TERM CURRENT USE OF INSULIN (HCC): ICD-10-CM

## 2021-11-24 DIAGNOSIS — E11.9 TYPE 2 DIABETES MELLITUS WITHOUT COMPLICATION, WITH LONG-TERM CURRENT USE OF INSULIN (HCC): ICD-10-CM

## 2021-11-24 DIAGNOSIS — I10 ESSENTIAL HYPERTENSION: ICD-10-CM

## 2021-11-24 DIAGNOSIS — I25.10 CORONARY ARTERY DISEASE INVOLVING NATIVE HEART WITHOUT ANGINA PECTORIS, UNSPECIFIED VESSEL OR LESION TYPE: ICD-10-CM

## 2021-11-24 DIAGNOSIS — E78.5 HYPERLIPIDEMIA, UNSPECIFIED HYPERLIPIDEMIA TYPE: ICD-10-CM

## 2021-11-24 PROCEDURE — G8417 CALC BMI ABV UP PARAM F/U: HCPCS | Performed by: INTERNAL MEDICINE

## 2021-11-24 PROCEDURE — 99214 OFFICE O/P EST MOD 30 MIN: CPT | Performed by: INTERNAL MEDICINE

## 2021-11-24 PROCEDURE — G8484 FLU IMMUNIZE NO ADMIN: HCPCS | Performed by: INTERNAL MEDICINE

## 2021-11-24 PROCEDURE — 1036F TOBACCO NON-USER: CPT | Performed by: INTERNAL MEDICINE

## 2021-11-24 PROCEDURE — G8427 DOCREV CUR MEDS BY ELIG CLIN: HCPCS | Performed by: INTERNAL MEDICINE

## 2021-11-24 PROCEDURE — 1123F ACP DISCUSS/DSCN MKR DOCD: CPT | Performed by: INTERNAL MEDICINE

## 2021-11-24 PROCEDURE — 4040F PNEUMOC VAC/ADMIN/RCVD: CPT | Performed by: INTERNAL MEDICINE

## 2021-11-24 RX ORDER — PANTOPRAZOLE SODIUM 20 MG/1
20 TABLET, DELAYED RELEASE ORAL
Qty: 90 TABLET | Refills: 1 | Status: SHIPPED
Start: 2021-11-24 | End: 2022-03-23 | Stop reason: SDUPTHER

## 2021-11-24 RX ORDER — LOSARTAN POTASSIUM 25 MG/1
25 TABLET ORAL DAILY
COMMUNITY
End: 2021-11-24 | Stop reason: ALTCHOICE

## 2021-11-24 RX ORDER — CLOPIDOGREL BISULFATE 75 MG/1
75 TABLET ORAL DAILY
Qty: 90 TABLET | Refills: 1 | Status: SHIPPED
Start: 2021-11-24 | End: 2022-03-23 | Stop reason: SDUPTHER

## 2021-11-24 RX ORDER — ATORVASTATIN CALCIUM 20 MG/1
20 TABLET, FILM COATED ORAL EVERY OTHER DAY
Qty: 45 TABLET | Refills: 1 | Status: SHIPPED
Start: 2021-11-24 | End: 2022-03-23 | Stop reason: SDUPTHER

## 2021-11-25 NOTE — PROGRESS NOTES
[address] [facility]      21  Joseph IBARRA Covert : 1935 Sex: male  Age: 82 y.o. Chief Complaint   Patient presents with    Coronary Artery Disease     4 months    Hypertension       HPI    Patient presents today for 4-month follow-up visit on his medical problems. He is accompanied by his wife today. In general has been doing reasonably well since we saw him last.  He still gets his occasional dizzy episodes but has had no falls with this. Does seem to be worse when he gets up too quickly. I did tell him he needs to move more slowly and deliberately to try and avoid this. He does check his blood pressure once in a while and it has been stable and not dropping. Blood sugars he states have been variable and is following with endocrine with last A1c of 6.7. I did ask him to make sure he is checking pressure and blood sugar with any of these dizzy episodes. His GERD has improved on the Protonix which started last time. His lipids have been stable on statin medication which he is tolerating. Chronic kidney disease has been stable and follows with renal I did review all of the recent labs and notes as well as that of endocrine. He is up-to-date with all of his consultants including renal, endocrine, dermatology, cardiology, ophthalmology      Review of Systems   Const: Reports fatigue (improved), but denies chills, fever and sweats. CV:  denies  orthopnea and palpitations.    Resp: Denies cough, SOB and wheezing. GI: Denies diarrhea, nausea and vomiting. Musculo: Reports arthritis, neck pain and back pain/neck pain improved  Neuro: Reports dizziness but denies headache, numbness/tingling, seizures, syncope and weakness/dizziness-improved --occurs when he moves or gets up quickly. Endocrine: Reports diabetes. -See above  Hema/Lymph: Denies easy bleeding, excessive bleeding, postsurgical bleeding and bleeding/clotting  disorder.             REST OF PERTINENT ROS GONE OVER AND WAS NEGATIVE. episode 3/20     SH: Marital: Legal Status: . retired printer  Personal Habits: Cigarette Use: Negative For current cigarette smoker. Alcohol: Occasionally  consumes alcohol          Current Outpatient Medications:     pantoprazole (PROTONIX) 20 MG tablet, Take 1 tablet by mouth every morning (before breakfast), Disp: 90 tablet, Rfl: 1    metoprolol tartrate (LOPRESSOR) 25 MG tablet, TAKE 1/2 TABLET BY MOUTH TWICE DAILY, Disp: 90 tablet, Rfl: 1    atorvastatin (LIPITOR) 20 MG tablet, Take 1 tablet by mouth every other day, Disp: 45 tablet, Rfl: 1    clopidogrel (PLAVIX) 75 MG tablet, Take 1 tablet by mouth daily, Disp: 90 tablet, Rfl: 1    Aspirin Buf,CaCarb-MgCarb-MgO, 81 MG TABS, Take 81 mg by mouth daily, Disp: , Rfl:     nitroGLYCERIN (NITROSTAT) 0.3 MG SL tablet, 1 tablet as needed, Disp: , Rfl:     HUMALOG MIX 75/25 KWIKPEN (75-25) 100 UNIT/ML SUPN injection pen, 20 units am, 18 units pm, Disp: , Rfl:     B-D ULTRAFINE III SHORT PEN 31G X 8 MM MISC, USE TID AS DIRECTED, Disp: , Rfl:     INSULIN SYRINGE 1CC/29G 29G X 1/2\" 1 ML MISC, USE TID AS DIRECTED., Disp: , Rfl:   Allergies   Allergen Reactions    Erythromycin     Penicillins        Past Medical History:   Diagnosis Date    Chronic kidney disease 5/23/2019    Coronary artery disease involving native heart without angina pectoris 5/23/2019    Essential hypertension 5/23/2019    Hyperlipidemia 5/23/2019    Type 2 diabetes mellitus without complication, with long-term current use of insulin ([facility] 5/23/2019     No past surgical history on file. No family history on file.   Social History     Socioeconomic History    Marital status: Unknown     Spouse name: Micaela Mahoney Number of children: 2    Years of education: 15    Highest education level: 12th grade   Occupational History    Not on file   Tobacco Use    Smoking status: Never Smoker    Smokeless tobacco: Never Used   Substance and Sexual Activity    Alcohol use: Not on file    Drug use: Not on file    Sexual activity: Not on file   Other Topics Concern    Not on file   Social History Narrative    Not on file     Social Determinants of Health     Financial Resource Strain: Low Risk     Difficulty of Paying Living Expenses: Not hard at all   Food Insecurity: No Food Insecurity    Worried About Running Out of Food in the Last Year: Never true    [address] in the Last Year: Never true   Transportation Needs:     Lack of Transportation (Medical): Not on file    Lack of Transportation (Non-Medical): Not on file   Physical Activity:     Days of Exercise per Week: Not on file    Minutes of Exercise per Session: Not on file   Stress:     Feeling of Stress : Not on file   Social Connections:     Frequency of Communication with Friends and Family: Not on file    Frequency of Social Gatherings with Friends and Family: Not on file    Attends Sabianism Services: Not on file    Active Member of [address] [place] [address] [employer] or Organizations: Not on file    Attends Club or Organization Meetings: Not on file    Marital Status: Not on file   Intimate Partner Violence:     Fear of Current or Ex-Partner: Not on file    Emotionally Abused: Not on file    Physically Abused: Not on file    Sexually Abused: Not on file   Housing Stability:     Unable to Pay for Housing in the Last Year: Not on file    Number of [place] in the Last Year: Not on file    Unstable Housing in the Last Year: Not on file       Vitals:    11/24/21 1254   BP: 112/64   Pulse: 83   Temp: 97.3 °F (36.3 °C)   TempSrc: Temporal   SpO2: 97%   Weight: 171 lb 3.2 oz (77.7 kg)   Height: 5' 9\" (1.753 m)       Physical Exam    Const: Appears well developed and well nourished. No signs of acute distress present. Somewhat frail-appearing  Neck: Supple and symmetric. Palpation reveals no adenopathy. No masses appreciated. Thyroid  exhibits no nodule or thyromegaly. No JVD. Carotids: 2+ and equal bilaterally, without bruits.   Resp: No rales, rhonchi or wheezes appreciated over the lungs bilaterally. CV: Rate is regular. Rhythm is regular. S1 is normal. S2 is normal. No gallop or rubs. No heart  murmur appreciated/pulses distally are palpable and okay. Extremities:  no clubbing or  cyanosis/trace  Abdomen: Bowel sounds are normoactive. Palpation reveals softness, with no distension,  organomegaly or tenderness. No abdominal masses palpable. No palpable hepatosplenomegaly. Musculo: Walks with a normal gait. Upper Extremities: Full ROM bilaterally. Lower Extremities: Full  ROM bilaterally. Psych: Patient's attitude is cooperative. Patient's affect is appropriate. Judgement is realistic. Insight  is appropriate. Neurologically appears to be grossly intact without focal deficits noted.           Assessment and Plan:  Maurice Garcia was seen today for coronary artery disease and hypertension. Diagnoses and all orders for this visit:    Essential hypertension  -     metoprolol tartrate (LOPRESSOR) 25 MG tablet; TAKE 1/2 TABLET BY MOUTH TWICE DAILY  Stable    Coronary artery disease involving native heart without angina pectoris, unspecified vessel or lesion type  -     atorvastatin (LIPITOR) 20 MG tablet; Take 1 tablet by mouth every other day  -     clopidogrel (PLAVIX) 75 MG tablet; Take 1 tablet by mouth daily  Stable and follows with cardiology    Type 2 diabetes mellitus without complication, with long-term current use of insulin (HCC)  Stable and follows with endocrine    Gastroesophageal reflux disease without esophagitis  Improved on PPI    Chronic kidney disease, unspecified CKD stage  Stable and follows with renal    Hyperlipidemia, unspecified hyperlipidemia type  Stable on statin medication    Other orders  -     pantoprazole (PROTONIX) 20 MG tablet; Take 1 tablet by mouth every morning (before breakfast)    Plan: I will see him back in 4 months and as needed. Follow with above consultants.   Continue to monitor blood pressure and blood sugar.  Fall precautions. Prescription management performed. Reviewed all of his recent blood work with him. Notify us of problems in the interim. Return in about 4 months (around 3/24/2022). Seen By:  Marco A Baeza MD      *Document was created using voice recognition software. Note was reviewed however may contain grammatical errors.

## 2022-03-23 ENCOUNTER — OFFICE VISIT (OUTPATIENT)
Dept: FAMILY MEDICINE CLINIC | Age: 87
End: 2022-03-23
Payer: MEDICARE

## 2022-03-23 VITALS
DIASTOLIC BLOOD PRESSURE: 70 MMHG | HEART RATE: 81 BPM | SYSTOLIC BLOOD PRESSURE: 128 MMHG | WEIGHT: 173.4 LBS | OXYGEN SATURATION: 96 % | BODY MASS INDEX: 25.68 KG/M2 | HEIGHT: 69 IN | TEMPERATURE: 98.4 F

## 2022-03-23 VITALS
DIASTOLIC BLOOD PRESSURE: 70 MMHG | BODY MASS INDEX: 25.68 KG/M2 | HEIGHT: 69 IN | SYSTOLIC BLOOD PRESSURE: 128 MMHG | WEIGHT: 173.4 LBS | TEMPERATURE: 98.4 F | OXYGEN SATURATION: 96 % | HEART RATE: 81 BPM

## 2022-03-23 DIAGNOSIS — N18.32 TYPE 2 DIABETES MELLITUS WITH STAGE 3B CHRONIC KIDNEY DISEASE, WITH LONG-TERM CURRENT USE OF INSULIN (HCC): ICD-10-CM

## 2022-03-23 DIAGNOSIS — E11.22 TYPE 2 DIABETES MELLITUS WITH STAGE 3B CHRONIC KIDNEY DISEASE, WITH LONG-TERM CURRENT USE OF INSULIN (HCC): ICD-10-CM

## 2022-03-23 DIAGNOSIS — E78.5 HYPERLIPIDEMIA, UNSPECIFIED HYPERLIPIDEMIA TYPE: ICD-10-CM

## 2022-03-23 DIAGNOSIS — Z79.4 TYPE 2 DIABETES MELLITUS WITH STAGE 3B CHRONIC KIDNEY DISEASE, WITH LONG-TERM CURRENT USE OF INSULIN (HCC): ICD-10-CM

## 2022-03-23 DIAGNOSIS — E87.6 HYPOKALEMIA: ICD-10-CM

## 2022-03-23 DIAGNOSIS — Z00.00 INITIAL MEDICARE ANNUAL WELLNESS VISIT: Primary | ICD-10-CM

## 2022-03-23 DIAGNOSIS — K21.9 GASTROESOPHAGEAL REFLUX DISEASE WITHOUT ESOPHAGITIS: ICD-10-CM

## 2022-03-23 DIAGNOSIS — I25.10 CORONARY ARTERY DISEASE INVOLVING NATIVE HEART WITHOUT ANGINA PECTORIS, UNSPECIFIED VESSEL OR LESION TYPE: ICD-10-CM

## 2022-03-23 DIAGNOSIS — I10 ESSENTIAL HYPERTENSION: ICD-10-CM

## 2022-03-23 DIAGNOSIS — E55.9 VITAMIN D DEFICIENCY: ICD-10-CM

## 2022-03-23 LAB
POTASSIUM SERPL-SCNC: 5 MMOL/L (ref 3.5–5)
VITAMIN D 25-HYDROXY: 31 NG/ML (ref 30–100)

## 2022-03-23 PROCEDURE — G8417 CALC BMI ABV UP PARAM F/U: HCPCS | Performed by: INTERNAL MEDICINE

## 2022-03-23 PROCEDURE — G8427 DOCREV CUR MEDS BY ELIG CLIN: HCPCS | Performed by: INTERNAL MEDICINE

## 2022-03-23 PROCEDURE — 1123F ACP DISCUSS/DSCN MKR DOCD: CPT | Performed by: INTERNAL MEDICINE

## 2022-03-23 PROCEDURE — 3044F HG A1C LEVEL LT 7.0%: CPT | Performed by: INTERNAL MEDICINE

## 2022-03-23 PROCEDURE — G0438 PPPS, INITIAL VISIT: HCPCS | Performed by: INTERNAL MEDICINE

## 2022-03-23 PROCEDURE — 99214 OFFICE O/P EST MOD 30 MIN: CPT | Performed by: INTERNAL MEDICINE

## 2022-03-23 PROCEDURE — G8484 FLU IMMUNIZE NO ADMIN: HCPCS | Performed by: INTERNAL MEDICINE

## 2022-03-23 PROCEDURE — 4040F PNEUMOC VAC/ADMIN/RCVD: CPT | Performed by: INTERNAL MEDICINE

## 2022-03-23 PROCEDURE — 1036F TOBACCO NON-USER: CPT | Performed by: INTERNAL MEDICINE

## 2022-03-23 RX ORDER — ATORVASTATIN CALCIUM 20 MG/1
20 TABLET, FILM COATED ORAL EVERY OTHER DAY
Qty: 45 TABLET | Refills: 1 | Status: SHIPPED
Start: 2022-03-23 | End: 2022-07-25 | Stop reason: SDUPTHER

## 2022-03-23 RX ORDER — PANTOPRAZOLE SODIUM 20 MG/1
20 TABLET, DELAYED RELEASE ORAL
Qty: 90 TABLET | Refills: 1 | Status: SHIPPED
Start: 2022-03-23 | End: 2022-07-25 | Stop reason: SDUPTHER

## 2022-03-23 RX ORDER — CLOPIDOGREL BISULFATE 75 MG/1
75 TABLET ORAL DAILY
Qty: 90 TABLET | Refills: 1 | Status: SHIPPED
Start: 2022-03-23 | End: 2022-07-25 | Stop reason: SDUPTHER

## 2022-03-23 SDOH — HEALTH STABILITY: PHYSICAL HEALTH: ON AVERAGE, HOW MANY DAYS PER WEEK DO YOU ENGAGE IN MODERATE TO STRENUOUS EXERCISE (LIKE A BRISK WALK)?: 3 DAYS

## 2022-03-23 SDOH — HEALTH STABILITY: PHYSICAL HEALTH: ON AVERAGE, HOW MANY MINUTES DO YOU ENGAGE IN EXERCISE AT THIS LEVEL?: 10 MIN

## 2022-03-23 ASSESSMENT — LIFESTYLE VARIABLES
HOW OFTEN DO YOU HAVE SIX OR MORE DRINKS ON ONE OCCASION: 1
HOW MANY STANDARD DRINKS CONTAINING ALCOHOL DO YOU HAVE ON A TYPICAL DAY: 1 OR 2
HOW MANY STANDARD DRINKS CONTAINING ALCOHOL DO YOU HAVE ON A TYPICAL DAY: 1
HOW OFTEN DO YOU HAVE A DRINK CONTAINING ALCOHOL: 1
HOW OFTEN DO YOU HAVE A DRINK CONTAINING ALCOHOL: NEVER

## 2022-03-23 ASSESSMENT — PATIENT HEALTH QUESTIONNAIRE - PHQ9
2. FEELING DOWN, DEPRESSED OR HOPELESS: 0
SUM OF ALL RESPONSES TO PHQ9 QUESTIONS 1 & 2: 0
SUM OF ALL RESPONSES TO PHQ QUESTIONS 1-9: 0
1. LITTLE INTEREST OR PLEASURE IN DOING THINGS: 0
SUM OF ALL RESPONSES TO PHQ QUESTIONS 1-9: 0
2. FEELING DOWN, DEPRESSED OR HOPELESS: 0
1. LITTLE INTEREST OR PLEASURE IN DOING THINGS: 0
SUM OF ALL RESPONSES TO PHQ QUESTIONS 1-9: 0
SUM OF ALL RESPONSES TO PHQ9 QUESTIONS 1 & 2: 0
SUM OF ALL RESPONSES TO PHQ QUESTIONS 1-9: 0

## 2022-03-23 NOTE — PATIENT INSTRUCTIONS
Personalized Preventive Plan for Astrid Baker Covert - 3/23/2022  Medicare offers a range of preventive health benefits. Some of the tests and screenings are paid in full while other may be subject to a deductible, co-insurance, and/or copay. Some of these benefits include a comprehensive review of your medical history including lifestyle, illnesses that may run in your family, and various assessments and screenings as appropriate. After reviewing your medical record and screening and assessments performed today your provider may have ordered immunizations, labs, imaging, and/or referrals for you. A list of these orders (if applicable) as well as your Preventive Care list are included within your After Visit Summary for your review. Other Preventive Recommendations:    · A preventive eye exam performed by an eye specialist is recommended every 1-2 years to screen for glaucoma; cataracts, macular degeneration, and other eye disorders. · A preventive dental visit is recommended every 6 months. · Try to get at least 150 minutes of exercise per week or 10,000 steps per day on a pedometer . · Order or download the FREE \"Exercise & Physical Activity: Your Everyday Guide\" from [employer]. Call 0-114.214.8743 or search [employer] online. · You need 4562-2011 mg of calcium and 7699-3797 IU of vitamin D per day. It is possible to meet your calcium requirement with diet alone, but a vitamin D supplement is usually necessary to meet this goal.  · When exposed to the sun, use a sunscreen that protects against both UVA and UVB radiation with an SPF of 30 or greater. Reapply every 2 to 3 hours or after sweating, drying off with a towel, or swimming. · Always wear a seat belt when traveling in a car. Always wear a helmet when riding a bicycle or motorcycle.

## 2022-03-23 NOTE — PROGRESS NOTES
Medicare Annual Wellness Visit    [address] [place] [address] is here for Medicare AWV    Assessment & Plan    Recommendations for Preventive Services Due: see orders and patient instructions/AVS.  Recommended screening schedule for the next 5-10 years is provided to the patient in written form: see Patient Instructions/AVS.     No follow-ups on file. Subjective   The following acute and/or chronic problems were also addressed today:  Had separate encounter for regular vitit today to address chronic issues    Patient's complete Health Risk Assessment and screening values have been reviewed and are found in Flowsheets. The following problems were reviewed today and where indicated follow up appointments were made and/or referrals ordered.     Positive Risk Factor Screenings with Interventions:             General Health and ACP:  General  In general, how would you say your health is?: Fair  In the past 7 days, have you experienced any of the following: New or Increased Pain, New or Increased Fatigue, Loneliness, Social Isolation, Stress or Anger?: No  Do you get the social and emotional support that you need?: Yes  Do you have a Living Will?: Yes    Advance Directives     Power of [address] Will ACP-Advance Directive ACP-Power of     Not on File Not on File Not on File Not on File      General Health Risk Interventions:  · no issues noted     Hearing/Vision:  Do you or your family notice any trouble with your hearing that hasn't been managed with hearing aids?: (!) Yes  Do you have difficulty driving, watching TV, or doing any of your daily activities because of your eyesight?: No  Have you had an eye exam within the past year?: Yes  No exam data present    Hearing/Vision Interventions:  · realizes needs hearing aid-- want to try otc first            Objective   Vitals:    03/23/22 1314   BP: 128/70   Pulse: 81   Temp: 98.4 °F (36.9 °C)   TempSrc: Temporal   SpO2: 96%   Weight: 173 lb 6.4 oz (78.7 kg)   Height: 5' 9\" (1.753 m)      Body mass index is 25.61 kg/m². Allergies   Allergen Reactions    Erythromycin     Penicillins      Prior to Visit Medications    Medication Sig Taking? Authorizing Provider   Cholecalciferol (VITAMIN D3) 125 MCG (5000 UT) TABS Take 0.5 tablets by mouth daily  Historical Provider, MD   atorvastatin (LIPITOR) 20 MG tablet Take 1 tablet by mouth every other day  Ysabel Molina MD   clopidogrel (PLAVIX) 75 MG tablet Take 1 tablet by mouth daily  Ysabel Molina MD   metoprolol tartrate (LOPRESSOR) 25 MG tablet TAKE 1/2 TABLET BY MOUTH TWICE DAILY  Ysabel Molina MD   pantoprazole (PROTONIX) 20 MG tablet Take 1 tablet by mouth every morning (before breakfast)  Ysabel Molina MD   Aspirin Buf,CaCarb-MgCarb-MgO, 81 MG TABS Take 81 mg by mouth daily  Historical Provider, MD   nitroGLYCERIN (NITROSTAT) 0.3 MG SL tablet 1 tablet as needed  Historical Provider, MD   HUMALOG MIX 75/25 KWIKPEN (75-25) 100 UNIT/ML SUPN injection pen 16 units am, 12 units pm  Historical Provider, MD [place] ULTRAFINE III SHORT PEN 31G X 8 MM MISC USE TID AS DIRECTED  Historical Provider, MD   INSULIN SYRINGE 1CC/29G 29G X 1/2\" 1 ML MISC USE TID AS DIRECTED.   Historical Provider, MD [place] (Including outside providers/suppliers regularly involved in providing care):   Patient Care Team:  Ysabel Molina MD as PCP - General (Internal Medicine)  Ysabel Molina MD as PCP - [address] [place] [address] Provider  Barak Nicholas DO (Internal Medicine)  Christopher Michel MD as Referring Physician (Dermatology)  Anne Morse DPM as Consulting Physician (Podiatry)  Sha Dillard MD as Consulting Physician (Cardiology)    Reviewed and updated this visit:

## 2022-03-24 ENCOUNTER — TELEPHONE (OUTPATIENT)
Dept: FAMILY MEDICINE CLINIC | Age: 87
End: 2022-03-24

## 2022-03-24 NOTE — TELEPHONE ENCOUNTER
Letter sent to patient letting him know that his labs came back okay. Attached a copy of the results for his records.

## 2022-03-24 NOTE — PROGRESS NOTES
[address] [facility] [address] [facility]     3/24/22  Joseph IBARRA Covert : 1935 Sex: male  Age: 82 y.o. Chief Complaint   Patient presents with    Hypertension     4 months       HPI  Patient presents today for 4-month follow-up visit on his medical problems. He is accompanied by his wife today. Since I seen him last he has seen endocrine for his diabetes, urology for elevated PSA and renal for his chronic kidney disease. I reviewed all of their notes. He has also been to the emergency room for hypoglycemic episode. I reviewed all the labs ordered through the emergency room and endocrine. His insulin was decreased by endocrine to 16 units a.m. 12 units p.m. His lipids have been stable on statin medication. GERD has been stable on his PPI. Sugars otherwise have been stable with last hemoglobin A1c of 6.8. His dizziness has been stable without falls. Typically when he gets up too quickly. He is up-to-date with all of his consultants including renal, endocrine, urology, dermatology, cardiology, ophthalmology    Review of Systems     Const: Reports fatigue (improved), but denies chills, fever and sweats. CV:  denies  orthopnea and palpitations.    Resp: Denies cough, SOB and wheezing. GI: Denies diarrhea, nausea and vomiting. Musculo: Reports arthritis, neck pain and back pain/neck pain improved  Neuro: Reports dizziness but denies headache, numbness/tingling, seizures, syncope and weakness/dizziness-improved --occurs when he moves or gets up quickly. Endocrine: Reports diabetes. -See above  Hema/Lymph: Denies easy bleeding, excessive bleeding, postsurgical bleeding and bleeding/clotting  disorder.           REST OF PERTINENT ROS GONE OVER AND WAS NEGATIVE.        PMH:  Problem List: Athscl heart disease of native coronary artery w/o ang pctrs, Essential hypertension,  Disorder of kidney and/or ureter, Proteinuria, Type 2 diabetes mellitus, Hyperlipidemia, Coronary  arteriosclerosis, Benign essential hypertension  Health Maintenance:  Influenza Vaccination - (10/11/2018)  Tetanus Immunization - (2018)  Couseled on Home Safety - (2018)  Colonoscopy - (10/11/2016), -no further recommended  Bone Density Test Screening - (2005)  Joseph IBARRA Covert  1935 Page #2  Stress Test - ,10/11,1/15-neg, 10/16-neg,-neg,-neg  Prostate Exam - ,7/10,,,,10/15, ,  PSA Test - \",7/10,,,, 2/15,11/15,,,  Rectal Exam - \",7/10,,,,10/15, ,  Hemmocult Cards - neg x 3-,3/12  EGD - ,,10/16, -no further recommended  2D ECHO - 10/11,   capsule endoscop -   EKG - ,  heart cath - ,9/15  Prevnar Vaccine - (2016)  Pneumonia Vaccination - (2009)  Zoster/Shingles Vaccine - given @ [facility]  Medical Problems:  Ischemic Cardiomyopathy, Renal Insufficiency, Proteinuria  Small Bowel Obstruction -   Non Insulin Dependent Diabetes  Coronary Artery Disease (CAD) - angioplasty with stent placement  Erythromycin Induced Hepatitis, Kidney Stones, Cervical Disc Surg/DR Dex Lopez, Sleep Apnea  positive lupus anticoagulant - prolonged ptt-saw heme/onc  CKD, Colon Polyps, Gastritis, helicobacter-treated, duodenal polyp, rotator cuff tear, Gastroesophageal  Reflux Disease (GERD), Hypertension, Hyperlipidemia, Diabetic Retinopathy  sees cardiology,derm,ophthalmology - endocrine,renal,gi, urology  heart catheter angioplasty and drug-eluting stent - 9/15  Orthostatic hypotension - Probably Related to autonomic neuropathy secondary to diabetes  Nasal surgery/septoplasty; Dr. Leticia Gipson  Cataracts with IOL bilateral  Excision skin cancer scalp, Dr. John Cervantes with syncopal episode 3/20  ER visit for hypoglycemia-3/22     SH: Marital: Legal Status: . retired printer  Personal Habits: Cigarette Use: Negative For current cigarette smoker. Alcohol: Occasionally  consumes alcohol                   Current Outpatient Medications:     Cholecalciferol (VITAMIN D3) 125 MCG (5000 UT) TABS, Take 0.5 tablets by mouth daily, Disp: , Rfl:     atorvastatin (LIPITOR) 20 MG tablet, Take 1 tablet by mouth every other day, Disp: 45 tablet, Rfl: 1    clopidogrel (PLAVIX) 75 MG tablet, Take 1 tablet by mouth daily, Disp: 90 tablet, Rfl: 1    metoprolol tartrate (LOPRESSOR) 25 MG tablet, TAKE 1/2 TABLET BY MOUTH TWICE DAILY, Disp: 90 tablet, Rfl: 1    pantoprazole (PROTONIX) 20 MG tablet, Take 1 tablet by mouth every morning (before breakfast), Disp: 90 tablet, Rfl: 1    Aspirin Buf,CaCarb-MgCarb-MgO, 81 MG TABS, Take 81 mg by mouth daily, Disp: , Rfl:     nitroGLYCERIN (NITROSTAT) 0.3 MG SL tablet, 1 tablet as needed, Disp: , Rfl:     HUMALOG MIX 75/25 KWIKPEN (75-25) 100 UNIT/ML SUPN injection pen, 16 units am, 12 units pm, Disp: , Rfl:     B-D ULTRAFINE III SHORT PEN 31G X 8 MM MISC, USE TID AS DIRECTED, Disp: , Rfl:     INSULIN SYRINGE 1CC/29G 29G X 1/2\" 1 ML MISC, USE TID AS DIRECTED., Disp: , Rfl:   Allergies   Allergen Reactions    Erythromycin     Penicillins        Past Medical History:   Diagnosis Date    Chronic kidney disease 5/23/2019    Coronary artery disease involving native heart without angina pectoris 5/23/2019    Essential hypertension 5/23/2019    Hyperlipidemia 5/23/2019    Type 2 diabetes mellitus without complication, with long-term [facility] 5/23/2019     No past surgical history on file. No family history on file.   Social History     Socioeconomic History    Marital status: Unknown     Spouse name: Rachelle Maciel Number of children: 2    Years of education: 15    Highest education level: 12th grade   Occupational History    Not on file   Tobacco Use    Smoking status: Never Smoker    Smokeless tobacco: Never Used   Substance and Sexual Activity    Alcohol use: Not on file    Drug use: Not on file    Sexual activity: Not on file   Other Topics Concern    Not on file Social History Narrative    Not on file     Social Determinants of Health     Financial Resource Strain: Low Risk     Difficulty of Paying Living Expenses: Not hard at all   Food Insecurity: No Food Insecurity    Worried About Running Out of Food in the Last Year: Never true    [address] in the Last Year: Never true   Transportation Needs:     Lack of Transportation (Medical): Not on file    Lack of Transportation (Non-Medical): Not on file   Physical Activity: Insufficiently Active    Days of Exercise per Week: 3 days    Minutes of Exercise per Session: 10 min   Stress:     Feeling of Stress : Not on file   Social Connections:     Frequency of Communication with Friends and Family: Not on file    Frequency of Social Gatherings with Friends and Family: Not on file    Attends Holiness Services: Not on file    Active Member of Clubs or Organizations: Not on file    Attends Club or Organization Meetings: Not on file    Marital Status: Not on file   Intimate Partner Violence:     Fear of Current or Ex-Partner: Not on file    Emotionally Abused: Not on file    Physically Abused: Not on file    Sexually Abused: Not on file   Housing Stability:     Unable to Pay for Housing in the Last Year: Not on file    Number of [place] in the Last Year: Not on file    Unstable Housing in the Last Year: Not on file       Vitals:    03/23/22 1258   BP: 128/70   Pulse: 81   Temp: 98.4 °F (36.9 °C)   TempSrc: Temporal   SpO2: 96%   Weight: 173 lb 6.4 oz (78.7 kg)   Height: 5' 9\" (1.753 m)       Physical Exam  Const: Appears well developed and well nourished. No signs of acute distress present.  Somewhat frail-appearing  Neck: Supple and symmetric. Palpation reveals no adenopathy. No masses appreciated. Thyroid  exhibits no nodule or thyromegaly. No JVD. Carotids: 2+ and equal bilaterally, without bruits. Resp: No rales, rhonchi or wheezes appreciated over the lungs bilaterally. CV: Rate is regular. Rhythm is regular. S1 is normal. S2 is normal. No gallop or rubs. No heart  murmur appreciated/pulses distally are palpable and okay. Extremities:  no clubbing or  cyanosis/trace  Abdomen: Bowel sounds are normoactive. Palpation reveals softness, with no distension,  organomegaly or tenderness. No abdominal masses palpable. No palpable hepatosplenomegaly. Musculo: Walks with a normal gait. Upper Extremities: Full ROM bilaterally. Lower Extremities: Full  ROM bilaterally. Psych: Patient's attitude is cooperative. Patient's affect is appropriate. Judgement is realistic. Insight  is appropriate. Neurologically appears to be grossly intact without focal deficits noted.                Assessment and Plan:  Zohaib Jj was seen today for hypertension. Diagnoses and all orders for this visit:    Coronary artery disease involving native heart without angina pectoris, unspecified vessel or lesion type  -     atorvastatin (LIPITOR) 20 MG tablet; Take 1 tablet by mouth every other day  -     clopidogrel (PLAVIX) 75 MG tablet; Take 1 tablet by mouth daily  Stable and following with cardiology    Essential hypertension  -     metoprolol tartrate (LOPRESSOR) 25 MG tablet; TAKE 1/2 TABLET BY MOUTH TWICE DAILY  Stable on current medication    Type 2 diabetes mellitus with stage 3b chronic kidney disease, with long-term current use of insulin (HCC)  Has had some fluctuation in hypoglycemic episode his insulin has been adjusted and does see endocrine    Gastroesophageal reflux disease without esophagitis  Stable on PPI    Hyperlipidemia, unspecified hyperlipidemia type  Stable on statin medication    Hypokalemia  -     Potassium; Future  Recently low and will be checked    Vitamin D deficiency  -     Vitamin D 25 Hydroxy; Future    Other orders  -     pantoprazole (PROTONIX) 20 MG tablet; Take 1 tablet by mouth every morning (before breakfast)    Plan: I will see him back in 4 months and as needed. Follow-up with above consultants. We will check a potassium and vitamin D level which were off on his recent labs. Continue to monitor blood sugar and blood pressure. Prescription management performed. Fall precautions. Notify us of problems in the interim. Return in about 4 months (around 7/23/2022). Seen By:  Boyd Ragland MD      *Document was created using voice recognition software. Note was reviewed however may contain grammatical errors.

## 2022-06-21 LAB
A/G RATIO: 1.2 RATIO (ref 1.1–2.2)
ALBUMIN SERPL-MCNC: 4 G/DL (ref 3.4–4.8)
ALP BLD-CCNC: 88 U/L (ref 42–121)
ALT SERPL-CCNC: 28 U/L (ref 10–63)
ANION GAP SERPL CALCULATED.3IONS-SCNC: 7 MEQ/L (ref 3–11)
AST SERPL-CCNC: 31 U/L (ref 10–41)
BASOPHILS ABSOLUTE: 0 K/UL (ref 0–0.2)
BASOPHILS RELATIVE PERCENT: 0.9 % (ref 0–1.5)
BILIRUB SERPL-MCNC: 0.7 MG/DL (ref 0.3–1.5)
BUN BLDV-MCNC: 23 MG/DL (ref 6–20)
CALCIUM SERPL-MCNC: 9.2 MG/DL (ref 8.5–10.5)
CHLORIDE BLD-SCNC: 99 MEQ/L (ref 98–107)
CO2: 28 MEQ/L (ref 21–31)
CREAT SERPL-MCNC: 1.7 MG/DL (ref 0.6–1.3)
CREATININE + EGFR PANEL: 47 ML/MIN
CREATININE URINE: 81.9 MG/DL (ref 22–328)
EOSINOPHILS ABSOLUTE: 0.4 K/UL (ref 0–0.33)
EOSINOPHILS RELATIVE PERCENT: 6.2 % (ref 0–3)
GFR NON-AFRICAN AMERICAN: 39 ML/MIN
GLOBULIN: 3.3 G/DL (ref 1.9–3.9)
GLUCOSE BLD-MCNC: 256 MG/DL (ref 70–99)
HCT VFR BLD CALC: 38.3 % (ref 41–50)
HEMOGLOBIN: 12.9 G/DL (ref 13.5–16.5)
LYMPHOCYTES ABSOLUTE: 1.3 K/UL (ref 1.1–4.8)
LYMPHOCYTES RELATIVE PERCENT: 22.4 % (ref 24–44)
MAGNESIUM: 1.6 MEQ/L (ref 1.6–2.6)
MCH RBC QN AUTO: 32.2 PG (ref 28–34)
MCHC RBC AUTO-ENTMCNC: 33.6 G/DL (ref 33–37)
MCV RBC AUTO: 95.7 FL (ref 80–100)
MONOCYTES ABSOLUTE: 0.6 K/UL (ref 0.2–0.7)
MONOCYTES RELATIVE PERCENT: 10.8 % (ref 3.4–9)
NEUTROPHILS ABSOLUTE: 3.5 K/UL (ref 1.83–8.7)
PDW BLD-RTO: 13.4 % (ref 10.9–14.3)
PHOSPHORUS: 2.5 MG/DL (ref 2.5–4.6)
PLATELET # BLD: 239 K/UL (ref 150–450)
PMV BLD AUTO: 7.3 FL (ref 7.4–10.4)
POTASSIUM SERPL-SCNC: 4.9 MEQ/L (ref 3.6–5)
PROTEIN/CREAT RATIO URINE RAN: 342 MG/G
RBC # BLD: 4 M/UL (ref 4.5–5.5)
SEGMENTED NEUTROPHILS RELATIVE PERCENT: 59.7 % (ref 40–74)
SODIUM BLD-SCNC: 134 MEQ/L (ref 135–145)
TOTAL PROTEIN, URINE: 28 MG/DL
TOTAL PROTEIN: 7.3 G/DL (ref 5.9–7.8)
URATE: 5.8 MG/DL (ref 4.4–7.6)
WBC # BLD: 5.8 K/UL (ref 4.5–11)

## 2022-07-25 ENCOUNTER — OFFICE VISIT (OUTPATIENT)
Dept: FAMILY MEDICINE CLINIC | Age: 87
End: 2022-07-25
Payer: MEDICARE

## 2022-07-25 ENCOUNTER — TELEPHONE (OUTPATIENT)
Dept: FAMILY MEDICINE CLINIC | Age: 87
End: 2022-07-25

## 2022-07-25 VITALS
SYSTOLIC BLOOD PRESSURE: 112 MMHG | WEIGHT: 163.8 LBS | DIASTOLIC BLOOD PRESSURE: 66 MMHG | TEMPERATURE: 97.4 F | BODY MASS INDEX: 24.26 KG/M2 | OXYGEN SATURATION: 97 % | HEART RATE: 73 BPM | HEIGHT: 69 IN

## 2022-07-25 DIAGNOSIS — E11.22 TYPE 2 DIABETES MELLITUS WITH STAGE 3B CHRONIC KIDNEY DISEASE, WITH LONG-TERM CURRENT USE OF INSULIN (HCC): ICD-10-CM

## 2022-07-25 DIAGNOSIS — N18.9 CHRONIC KIDNEY DISEASE, UNSPECIFIED CKD STAGE: ICD-10-CM

## 2022-07-25 DIAGNOSIS — I13.0 HYPERTENSIVE HEART AND KIDNEY DISEASE WITH HF AND WITH CKD STAGE I-IV (HCC): ICD-10-CM

## 2022-07-25 DIAGNOSIS — I25.10 CORONARY ARTERY DISEASE INVOLVING NATIVE HEART WITHOUT ANGINA PECTORIS, UNSPECIFIED VESSEL OR LESION TYPE: ICD-10-CM
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
## 2022-07-25 DIAGNOSIS — E78.5 HYPERLIPIDEMIA, UNSPECIFIED HYPERLIPIDEMIA TYPE: ICD-10-CM

## 2022-07-25 DIAGNOSIS — Z79.4 TYPE 2 DIABETES MELLITUS WITH STAGE 3B CHRONIC KIDNEY DISEASE, WITH LONG-TERM CURRENT USE OF INSULIN (HCC): ICD-10-CM

## 2022-07-25 DIAGNOSIS — N18.32 TYPE 2 DIABETES MELLITUS WITH STAGE 3B CHRONIC KIDNEY DISEASE, WITH LONG-TERM CURRENT USE OF INSULIN (HCC): ICD-10-CM

## 2022-07-25 DIAGNOSIS — K21.9 GASTROESOPHAGEAL REFLUX DISEASE WITHOUT ESOPHAGITIS: ICD-10-CM

## 2022-07-25 DIAGNOSIS — I10 ESSENTIAL HYPERTENSION: ICD-10-CM

## 2022-07-25 DIAGNOSIS — M25.511 ACUTE PAIN OF RIGHT SHOULDER: ICD-10-CM

## 2022-07-25 PROBLEM — N18.30 CHRONIC RENAL DISEASE, STAGE III (HCC): Status: ACTIVE | Noted: 2022-07-25

## 2022-07-25 PROCEDURE — 3051F HG A1C>EQUAL 7.0%<8.0%: CPT | Performed by: INTERNAL MEDICINE

## 2022-07-25 PROCEDURE — 1123F ACP DISCUSS/DSCN MKR DOCD: CPT | Performed by: INTERNAL MEDICINE

## 2022-07-25 PROCEDURE — 99214 OFFICE O/P EST MOD 30 MIN: CPT | Performed by: INTERNAL MEDICINE

## 2022-07-25 RX ORDER — PANTOPRAZOLE SODIUM 20 MG/1
20 TABLET, DELAYED RELEASE ORAL
Qty: 90 TABLET | Refills: 1 | Status: SHIPPED | OUTPATIENT
Start: 2022-07-25

## 2022-07-25 RX ORDER — CLOPIDOGREL BISULFATE 75 MG/1
75 TABLET ORAL DAILY
Qty: 90 TABLET | Refills: 1 | Status: SHIPPED | OUTPATIENT
Start: 2022-07-25

## 2022-07-25 RX ORDER — ATORVASTATIN CALCIUM 20 MG/1
20 TABLET, FILM COATED ORAL EVERY OTHER DAY
Qty: 45 TABLET | Refills: 1 | Status: SHIPPED | OUTPATIENT
Start: 2022-07-25

## 2022-07-25 NOTE — PROGRESS NOTES
[address] [place]     22  Lor Lanza Covert : 1935 Sex: male  Age: 82 y.o. Chief Complaint   Patient presents with    Hypertension     4 months    Hyperlipidemia       HPI    Patient presents today for 4-month follow-up visit on his multiple medical problems. He is accompanied by his wife today. He continues to complain of a lot of fatigue. Still complains of intermittent dizziness especially when he gets up too quickly. We talked about this numerous times about moving slowly and standing when he first gets up for period of time before trying to move. He did have a loss of balance episode felt injuring his right shoulder in the past month or so. He did have rotator cuff issue in the past which he seemed to do okay with and pain went away. Now with follow-up he is got it back again. I told him I was going to x-ray the shoulder here today. He is talking about possibly going back to Dr. Nohelia Santos of orthopedics to look at him which I told him was fine. He states he seen urology and renal as well as endocrine since I seen him last 4 months ago I do have endocrine note which I reviewed, but do not have renal or urology note which I will attempt to get to review. I do have labs ordered by those 3 specialties which I reviewed independently also. I told him given the above we do not need to do labs on him today. His blood pressure has been stable and controlled on medication. CAD stable and controlled and follows with cardiology. GERD has been stable and controlled on his pantoprazole which he is tolerating well and states it helps him significantly. Blood sugars have been in a good range and follows with endocrine. Lipids have been stable and controlled on statin medication. Patient has had a 10 pound weight loss in the past 4 months.   He states he is eating okay  He is up-to-date with all of his consultants including renal, endocrine, urology, dermatology, cardiology, ophthalmology Review of Systems  Const: Reports fatigue (improved), but denies chills, fever and sweats. CV:  denies  orthopnea and palpitations. Resp: Denies cough, SOB and wheezing. GI: Denies diarrhea, nausea and vomiting. Musculo: Reports arthritis, neck pain and back pain/neck pain improved--see above regarding right shoulder pain secondary to fall  Neuro: Reports dizziness but denies headache, numbness/tingling, seizures, syncope and weakness/dizziness-stable--occurs when he moves or gets up quickly. Endocrine: Reports diabetes. -See above  Hema/Lymph: Denies easy bleeding, excessive bleeding, postsurgical bleeding and bleeding/clotting  disorder. REST OF PERTINENT ROS GONE OVER AND WAS NEGATIVE.      PMH:  Problem List: Athscl heart disease of native coronary artery w/o ang pctrs, Essential hypertension,  Disorder of kidney and/or ureter, Proteinuria, Type 2 diabetes mellitus, Hyperlipidemia, Coronary  arteriosclerosis, Benign essential hypertension  Health Maintenance:  Influenza Vaccination - (10/11/2018)  Tetanus Immunization - (2018)  Couseled on Home Safety - (2018)  Colonoscopy - (10/11/2016), -no further recommended  Bone Density Test Screening - (2005)  Judah IBARRA Covert  1935 Page #2  Stress Test - ,10/11,1/15-neg, 10/16-neg,-neg,-neg  Prostate Exam - ,7/10,,,,10/15, ,  PSA Test - \",7/10,,,, 2/15,11/15,,,  Rectal Exam - \",7/10,,,,10/15, ,  Hemmocult Cards - neg x 3-,3/12  EGD - ,,10/16, -no further recommended  2D ECHO - 10/11,   capsule endoscop -   EKG - ,  heart cath - ,9/15  Prevnar Vaccine - (2016)  Pneumonia Vaccination - (2009)  Zoster/Shingles Vaccine - given @ [facility]  Medical Problems:  Ischemic Cardiomyopathy, Renal Insufficiency, Proteinuria  Small Bowel Obstruction -   Non Insulin Dependent Diabetes  Coronary Artery Disease (CAD) - angioplasty with stent placement  Erythromycin Induced Hepatitis, Kidney Stones, Cervical Disc Surg/DR Jane Grant, Sleep Apnea  positive lupus anticoagulant - prolonged ptt-saw heme/onc  CKD, Colon Polyps, Gastritis, helicobacter-treated, duodenal polyp, rotator cuff tear, Gastroesophageal  Reflux Disease (GERD), Hypertension, Hyperlipidemia, Diabetic Retinopathy  sees cardiology,derm,ophthalmology - endocrine,renal,gi, urology  heart catheter angioplasty and drug-eluting stent - 9/15  Orthostatic hypotension - Probably Related to autonomic neuropathy secondary to diabetes  Nasal surgery/septoplasty; Dr. Levi Rodríguez with IOL bilateral  Excision skin cancer scalp, Dr. Menendez Heart with syncopal episode 3/20  ER visit for hypoglycemia-3/22     SH:  Marital: Legal Status: . retired printer  Personal Habits: Cigarette Use: Negative For current cigarette smoker. Alcohol: Occasionally  consumes alcohol                  Current Outpatient Medications:     atorvastatin (LIPITOR) 20 MG tablet, Take 1 tablet by mouth every other day, Disp: 45 tablet, Rfl: 1    clopidogrel (PLAVIX) 75 MG tablet, Take 1 tablet by mouth in the morning., Disp: 90 tablet, Rfl: 1    metoprolol tartrate (LOPRESSOR) 25 MG tablet, TAKE 1/2 TABLET BY MOUTH TWICE DAILY, Disp: 90 tablet, Rfl: 1    pantoprazole (PROTONIX) 20 MG tablet, Take 1 tablet by mouth every morning (before breakfast), Disp: 90 tablet, Rfl: 1    Cholecalciferol (VITAMIN D3) 125 MCG (5000 UT) TABS, Take 0.5 tablets by mouth daily, Disp: , Rfl:     Aspirin Buf,CaCarb-MgCarb-MgO, 81 MG TABS, Take 81 mg by mouth daily, Disp: , Rfl:     nitroGLYCERIN (NITROSTAT) 0.3 MG SL tablet, 1 tablet as needed, Disp: , Rfl:     HUMALOG MIX 75/25 KWIKPEN (75-25) 100 UNIT/ML SUPN injection pen, 16 units am, 12 units pm, Disp: , Rfl:     B-D ULTRAFINE III SHORT PEN 31G X 8 MM MISC, USE TID AS DIRECTED, Disp: , Rfl:     INSULIN SYRINGE 1CC/29G 29G X 1/2\" 1 ML MISC, USE TID AS DIRECTED., Disp: , Rfl:   Allergies   Allergen Reactions    Erythromycin     Penicillins        Past Medical History:   Diagnosis Date    Chronic kidney disease 5/23/2019    Coronary artery disease involving native heart without angina pectoris 5/23/2019    Essential hypertension 5/23/2019    Hyperlipidemia 5/23/2019    Type 2 diabetes mellitus without complication, with long-term [facility] 5/23/2019     No past surgical history on file. No family history on file. Social History     Socioeconomic History    Marital status: Unknown     Spouse name: Gomez Aguilera    Number of children: 2    Years of education: 12    Highest education level: 12th grade   Occupational History    Not on file   Tobacco Use    Smoking status: Never    Smokeless tobacco: Never   Substance and Sexual Activity    Alcohol use: Not on file    Drug use: Not on file    Sexual activity: Not on file   Other Topics Concern    Not on file   Social History Narrative    Not on file     Social Determinants of Health     Financial Resource Strain: Not on file   Food Insecurity: Not on file   Transportation Needs: Not on file   Physical Activity: Insufficiently Active    Days of Exercise per Week: 3 days    Minutes of Exercise per Session: 10 min   Stress: Not on file   Social Connections: Not on file   Intimate Partner Violence: Not on file   Housing Stability: Not on file       Vitals:    07/25/22 1247   BP: 112/66   Pulse: 73   Temp: 97.4 °F (36.3 °C)   TempSrc: Temporal   SpO2: 97%   Weight: 163 lb 12.8 oz (74.3 kg)   Height: 5' 9\" (1.753 m)       Physical Exam  Const: Appears well developed and well nourished. No signs of acute distress present. Somewhat frail-appearing  Neck: Supple and symmetric. Palpation reveals no adenopathy. No masses appreciated. Thyroid  exhibits no nodule or thyromegaly. No JVD. Carotids: 2+ and equal bilaterally, without bruits.   Resp: No rales, rhonchi or wheezes appreciated over the lungs bilaterally. CV: Rate is regular. Rhythm is regular. S1 is normal. S2 is normal. No gallop or rubs. No heart  murmur appreciated/pulses distally are palpable and okay. Extremities:  no clubbing or  cyanosis/trace  Abdomen: Bowel sounds are normoactive. Palpation reveals softness, with no distension,  organomegaly or tenderness. No abdominal masses palpable. No palpable hepatosplenomegaly. Musculo: Walks with a normal gait. Upper Extremities: Some limitation range of motion right shoulder secondary to pain. No reproducible tenderness to palpation lower Extremities: Full  ROM bilaterally. Psych: Patient's attitude is cooperative. Patient's affect is appropriate. Judgement is realistic. Insight  is appropriate. Neurologically appears to be grossly intact without focal deficits noted. Assessment and Plan:  Maya Meyer was seen today for hypertension and hyperlipidemia. Diagnoses and all orders for this visit:    Coronary artery disease involving native heart without angina pectoris, unspecified vessel or lesion type  -     atorvastatin (LIPITOR) 20 MG tablet; Take 1 tablet by mouth every other day  -     clopidogrel (PLAVIX) 75 MG tablet; Take 1 tablet by mouth in the morning. Stable and controlled on current medication. He does follow-up with cardiology also. 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     Type 2 diabetes mellitus with stage 3b chronic kidney disease, with long-term current use of insulin (formerly [facility])  Stable and controlled on medication and following with endocrine    Fall, initial encounter  -     XR SHOULDER RIGHT (MIN 2 VIEWS); Future    Acute pain of right shoulder  -     XR SHOULDER RIGHT (MIN 2 VIEWS); Future    Other orders  -     pantoprazole (PROTONIX) 20 MG tablet; Take 1 tablet by mouth every morning (before breakfast)  Plan: No blood work today. Reviewed everything from other consultants today. Prescription management performed after review of efficacy of medication on his current medical problems. We will get x-ray right shoulder and visualize. Continue to follow with above consultants. Continue to monitor blood pressure and blood sugar closely. We will attempt to get notes from renal and urology to review. Fall precautions. Monitor weight loss. See back in 4 months and as needed. Notify us of problems in the interim. Return in about 4 months (around 11/25/2022). Seen By:  Gabriel Alexis MD      *Document was created using voice recognition software. Note was reviewed however may contain grammatical errors.

## 2022-11-21 ENCOUNTER — OFFICE VISIT (OUTPATIENT)
Dept: FAMILY MEDICINE CLINIC | Age: 87
End: 2022-11-21
Payer: MEDICARE

## 2022-11-21 VITALS
HEIGHT: 69 IN | BODY MASS INDEX: 24.5 KG/M2 | DIASTOLIC BLOOD PRESSURE: 64 MMHG | SYSTOLIC BLOOD PRESSURE: 116 MMHG | HEART RATE: 77 BPM | TEMPERATURE: 97.4 F | WEIGHT: 165.4 LBS | OXYGEN SATURATION: 97 %

## 2022-11-21 DIAGNOSIS — I25.10 CORONARY ARTERY DISEASE INVOLVING NATIVE HEART WITHOUT ANGINA PECTORIS, UNSPECIFIED VESSEL OR LESION TYPE: ICD-10-CM

## 2022-11-21 DIAGNOSIS — E11.22 TYPE 2 DIABETES MELLITUS WITH STAGE 3B CHRONIC KIDNEY DISEASE, WITH LONG-TERM CURRENT USE OF INSULIN (HCC): ICD-10-CM

## 2022-11-21 DIAGNOSIS — E78.5 HYPERLIPIDEMIA, UNSPECIFIED HYPERLIPIDEMIA TYPE: ICD-10-CM

## 2022-11-21 DIAGNOSIS — Z79.4 TYPE 2 DIABETES MELLITUS WITH STAGE 3B CHRONIC KIDNEY DISEASE, WITH LONG-TERM CURRENT USE OF INSULIN (HCC): ICD-10-CM

## 2022-11-21 DIAGNOSIS — K21.9 GASTROESOPHAGEAL REFLUX DISEASE WITHOUT ESOPHAGITIS: ICD-10-CM

## 2022-11-21 DIAGNOSIS — I10 ESSENTIAL HYPERTENSION: ICD-10-CM

## 2022-11-21 DIAGNOSIS — N18.9 CHRONIC KIDNEY DISEASE, UNSPECIFIED CKD STAGE: ICD-10-CM

## 2022-11-21 DIAGNOSIS — N18.32 TYPE 2 DIABETES MELLITUS WITH STAGE 3B CHRONIC KIDNEY DISEASE, WITH LONG-TERM CURRENT USE OF INSULIN (HCC): ICD-10-CM

## 2022-11-21 PROCEDURE — 1123F ACP DISCUSS/DSCN MKR DOCD: CPT | Performed by: INTERNAL MEDICINE

## 2022-11-21 PROCEDURE — G8420 CALC BMI NORM PARAMETERS: HCPCS | Performed by: INTERNAL MEDICINE

## 2022-11-21 PROCEDURE — G8427 DOCREV CUR MEDS BY ELIG CLIN: HCPCS | Performed by: INTERNAL MEDICINE

## 2022-11-21 PROCEDURE — 3074F SYST BP LT 130 MM HG: CPT | Performed by: INTERNAL MEDICINE

## 2022-11-21 PROCEDURE — 3078F DIAST BP <80 MM HG: CPT | Performed by: INTERNAL MEDICINE

## 2022-11-21 PROCEDURE — 1036F TOBACCO NON-USER: CPT | Performed by: INTERNAL MEDICINE

## 2022-11-21 PROCEDURE — 99214 OFFICE O/P EST MOD 30 MIN: CPT | Performed by: INTERNAL MEDICINE

## 2022-11-21 PROCEDURE — G8484 FLU IMMUNIZE NO ADMIN: HCPCS | Performed by: INTERNAL MEDICINE

## 2022-11-21 PROCEDURE — 3051F HG A1C>EQUAL 7.0%<8.0%: CPT | Performed by: INTERNAL MEDICINE

## 2022-11-21 RX ORDER — ATORVASTATIN CALCIUM 20 MG/1
20 TABLET, FILM COATED ORAL EVERY OTHER DAY
Qty: 45 TABLET | Refills: 1 | Status: SHIPPED | OUTPATIENT
Start: 2022-11-21

## 2022-11-21 RX ORDER — PANTOPRAZOLE SODIUM 20 MG/1
20 TABLET, DELAYED RELEASE ORAL
Qty: 90 TABLET | Refills: 1 | Status: SHIPPED | OUTPATIENT
Start: 2022-11-21

## 2022-11-21 RX ORDER — CLOPIDOGREL BISULFATE 75 MG/1
75 TABLET ORAL DAILY
Qty: 90 TABLET | Refills: 1 | Status: SHIPPED | OUTPATIENT
Start: 2022-11-21

## 2022-11-21 NOTE — PROGRESS NOTES
[address] [place]     22  Jennfier Gee Covert : 1935 Sex: male  Age: 82 y.o. Chief Complaint   Patient presents with    Hypertension     4 months       HPI    Presents today accompanied by wife for 4-month follow-up visit on his medical problems. I reviewed my last note. Also reviewed notes from renal and cardiology who he has seen since I have saw him last.  He has had blood work done from both endocrine and renal since I have seen him and I reviewed those labs. States he has got upcoming labs also to be done in the next couple weeks. Seems to be up-to-date with everybody. Weight is up 2 pounds from last visit. This is after having lost 10 pounds in the last interval.  I did ask him to keep an eye on this. States his appetite has been good. Declines any falls. His blood pressure has been stable and controlled on medication. CAD stable and controlled and follows with cardiology. Currently on Plavix for stents and tolerating this well with no bleeding. GERD has been stable and controlled on his pantoprazole which he is tolerating well and states it helps him significantly. Blood sugars have been in a good range and follows with endocrine. Lipids have been stable and controlled on statin medication. He is up-to-date with all of his consultants including renal, endocrine, urology, dermatology, cardiology, ophthalmology          Review of Systems  Const: Reports fatigue (improved), but denies chills, fever and sweats. CV:  denies  orthopnea and palpitations. Resp: Denies cough, SOB and wheezing. GI: Denies diarrhea, nausea and vomiting. Musculo: Reports arthritis, neck pain and back pain/neck pain improved  Neuro: Reports dizziness but denies headache, numbness/tingling, seizures, syncope and weakness/dizziness-stable--occurs when he moves or gets up quickly. Endocrine: Reports diabetes. -See above  Hema/Lymph: Denies easy bleeding, excessive bleeding, postsurgical bleeding and bleeding/clotting  disorder. REST OF PERTINENT ROS GONE OVER AND WAS NEGATIVE.    PMH:  Problem List: Athscl heart disease of native coronary artery w/o ang pctrs, Essential hypertension,  Disorder of kidney and/or ureter, Proteinuria, Type 2 diabetes mellitus, Hyperlipidemia, Coronary  arteriosclerosis, Benign essential hypertension  Health Maintenance:  Influenza Vaccination - (10/11/2018)  Tetanus Immunization - (2018)  Couseled on Home Safety - (2018)  Colonoscopy - (10/11/2016), -no further recommended  Bone Density Test Screening - (2005)  Joseph IBARRA Covert  1935 Page #2  Stress Test - ,10/11,1/15-neg, 10/16-neg,-neg,-neg  Prostate Exam - ,7/10,,,,10/15, ,  PSA Test - \",7/10,,,, 2/15,11/15,,,  Rectal Exam - \",7/10,,,,10/15, ,  Hemmocult Cards - neg x 3-,3/12  EGD - ,,10/16, -no further recommended  2D ECHO - 10/11,   capsule endoscop -   EKG - ,  heart cath - ,9/15  Prevnar Vaccine - (2016)  Pneumonia Vaccination - (2009)  Zoster/Shingles Vaccine - given @ [facility]  Medical Problems:  Ischemic Cardiomyopathy, Renal Insufficiency, Proteinuria  Small Bowel Obstruction -   Non Insulin Dependent Diabetes  Coronary Artery Disease (CAD) - angioplasty with stent placement  Erythromycin Induced Hepatitis, Kidney Stones, Cervical Disc Surg/DR Rodrigo Salmon, Sleep Apnea  positive lupus anticoagulant - prolonged ptt-saw heme/onc  CKD, Colon Polyps, Gastritis, helicobacter-treated, duodenal polyp, rotator cuff tear, Gastroesophageal  Reflux Disease (GERD), Hypertension, Hyperlipidemia, Diabetic Retinopathy  sees cardiology,derm,ophthalmology - endocrine,renal,gi, urology  heart catheter angioplasty and drug-eluting stent - 9/15  Orthostatic hypotension - Probably Related to autonomic neuropathy secondary to diabetes  Nasal surgery/septoplasty;  [place]  Cataracts with IOL bilateral  Excision skin cancer scalp, Dr. Alessandro Quinones  Hospitalized with syncopal episode 3/20  ER visit for hypoglycemia-3/22     SH:  Marital: Legal Status: . retired printer  Personal Habits: Cigarette Use: Negative For current cigarette smoker. Alcohol: Occasionally  consumes alcohol                          Current Outpatient Medications:     atorvastatin (LIPITOR) 20 MG tablet, Take 1 tablet by mouth every other day, Disp: 45 tablet, Rfl: 1    clopidogrel (PLAVIX) 75 MG tablet, Take 1 tablet by mouth daily, Disp: 90 tablet, Rfl: 1    metoprolol tartrate (LOPRESSOR) 25 MG tablet, TAKE 1/2 TABLET BY MOUTH TWICE DAILY, Disp: 90 tablet, Rfl: 1    pantoprazole (PROTONIX) 20 MG tablet, Take 1 tablet by mouth every morning (before breakfast), Disp: 90 tablet, Rfl: 1    Cholecalciferol (VITAMIN D3) 125 MCG (5000 UT) TABS, Take 0.5 tablets by mouth daily, Disp: , Rfl:     Aspirin Buf,CaCarb-MgCarb-MgO, 81 MG TABS, Take 81 mg by mouth daily, Disp: , Rfl:     nitroGLYCERIN (NITROSTAT) 0.3 MG SL tablet, 1 tablet as needed, Disp: , Rfl:     HUMALOG MIX 75/25 KWIKPEN (75-25) 100 UNIT/ML SUPN injection pen, 16 units am, 12 units pm, Disp: , Rfl:     B-D ULTRAFINE III SHORT PEN 31G X 8 MM MISC, USE TID AS DIRECTED, Disp: , Rfl:     INSULIN SYRINGE 1CC/29G 29G X 1/2\" 1 ML MISC, USE TID AS DIRECTED., Disp: , Rfl:   Allergies   Allergen Reactions    Erythromycin     Penicillins        Past Medical History:   Diagnosis Date    Chronic kidney disease 5/23/2019    Coronary artery disease involving native heart without angina pectoris 5/23/2019    Essential hypertension 5/23/2019    Hyperlipidemia 5/23/2019    Type 2 diabetes mellitus without complication, with long-term current use of insulin ([facility] 5/23/2019     No past surgical history on file. No family history on file.   Social History     Socioeconomic History    Marital status: Unknown     Spouse name: Mary Mello    Number of children: 2    Years of education: 12    Highest education level: 12th grade   Occupational History    Not on file   Tobacco Use    Smoking status: Never    Smokeless tobacco: Never   Substance and Sexual Activity    Alcohol use: Not on file    Drug use: Not on file    Sexual activity: Not on file   Other Topics Concern    Not on file   Social History Narrative    Not on file     Social Determinants of Health     Financial Resource Strain: Not on file   Food Insecurity: Not on file   Transportation Needs: Not on file   Physical Activity: Insufficiently Active    Days of Exercise per Week: 3 days    Minutes of Exercise per Session: 10 min   Stress: Not on file   Social Connections: Not on file   Intimate Partner Violence: Not on file   Housing Stability: Not on file       Vitals:    11/21/22 1329   BP: 116/64   Pulse: 77   Temp: 97.4 °F (36.3 °C)   TempSrc: Temporal   SpO2: 97%   Weight: 165 lb 6.4 oz (75 kg)   Height: 5' 9\" (1.753 m)       Physical Exam    Const: Appears well developed and well nourished. No signs of acute distress present. Somewhat frail-appearing  Neck: Supple and symmetric. Palpation reveals no adenopathy. No masses appreciated. Thyroid  exhibits no nodule or thyromegaly. No JVD. Carotids: 2+ and equal bilaterally, without bruits. Resp: No rales, rhonchi or wheezes appreciated over the lungs bilaterally. CV: Rate is regular. Rhythm is regular. S1 is normal. S2 is normal. No gallop or rubs. No heart  murmur appreciated/pulses distally are palpable and okay. Extremities:  no clubbing or  cyanosis/trace  Abdomen: Bowel sounds are normoactive. Palpation reveals softness, with no distension,  organomegaly or tenderness. No abdominal masses palpable. No palpable hepatosplenomegaly. Musculo: Walks with a normal gait. Upper Extremities: Appears to have good range of motion today. Lower Extremities: Full  ROM bilaterally. Psych: Patient's attitude is cooperative. Patient's affect is appropriate. Judgement is realistic. Insight  is appropriate. Neurologically appears to be grossly intact without focal deficits noted. Assessment and Plan:  Alesia Bosworth was seen today for hypertension. Diagnoses and all orders for this visit:    Essential hypertension  -     metoprolol tartrate (LOPRESSOR) 25 MG tablet; TAKE 1/2 TABLET BY MOUTH TWICE DAILY  Stable and controlled on current medication    Gastroesophageal reflux disease without esophagitis  Stable and controlled on PPI. Hyperlipidemia, unspecified hyperlipidemia type  Stable and controlled on statin medication. Coronary artery disease involving native heart without angina pectoris, unspecified vessel or lesion type  -     atorvastatin (LIPITOR) 20 MG tablet; Take 1 tablet by mouth every other day  -     clopidogrel (PLAVIX) 75 MG tablet; Take 1 tablet by mouth daily  Stable and following with cardiology. We are prescribing his Plavix which she is tolerating. Chronic kidney disease, unspecified CKD stage  Stable and following with renal    Type 2 diabetes mellitus with stage 3b chronic kidney disease, with long-term current use of insulin (HCC)  Stable and following with endocrine. Other orders  -     pantoprazole (PROTONIX) 20 MG tablet; Take 1 tablet by mouth every morning (before breakfast)  Plan: Patient is going to see Dr. Suresh Stahl in [facility] as a new physician in the next couple months. I told him to sign a release and we could send his records. Monitor weight status. Monitor blood pressure. Fall precautions. See above body report. Did review consultants records and labs. I will not order any labs today given what I have seen and was going to be done upcoming. Prescription management performed after review of efficacy of medication on current medical problems. Notify us of problems in the interim. Return in about 4 months (around 3/21/2023) for fu with new physician.     Seen By:  William Caruso MD      *Document was created using voice recognition software. Note was reviewed however may contain grammatical errors.

## 2022-12-20 LAB
A/G RATIO: 1.2 RATIO (ref 1.1–2.2)
ALBUMIN SERPL-MCNC: 4 G/DL (ref 3.4–4.8)
ALP BLD-CCNC: 73 U/L (ref 42–121)
ALT SERPL-CCNC: 24 U/L (ref 10–63)
ANION GAP SERPL CALCULATED.3IONS-SCNC: 10 MEQ/L (ref 3–11)
AST SERPL-CCNC: 27 U/L (ref 10–41)
BASOPHILS ABSOLUTE: 0.1 K/UL (ref 0–0.2)
BASOPHILS RELATIVE PERCENT: 1 % (ref 0–1.5)
BILIRUB SERPL-MCNC: 0.9 MG/DL (ref 0.3–1.5)
BUN BLDV-MCNC: 25 MG/DL (ref 6–20)
CALCIUM SERPL-MCNC: 9.2 MG/DL (ref 8.5–10.5)
CHLORIDE BLD-SCNC: 99 MEQ/L (ref 98–107)
CO2: 25 MEQ/L (ref 21–31)
CREAT SERPL-MCNC: 1.7 MG/DL (ref 0.6–1.3)
CREATININE + EGFR PANEL: 47 ML/MIN
CREATININE URINE: 90.2 MG/DL (ref 22–328)
EOSINOPHILS ABSOLUTE: 0.1 K/UL (ref 0–0.33)
EOSINOPHILS RELATIVE PERCENT: 2.4 % (ref 0–3)
GFR NON-AFRICAN AMERICAN: 39 ML/MIN
GLOBULIN: 3.4 G/DL (ref 1.9–3.9)
GLUCOSE BLD-MCNC: 220 MG/DL (ref 70–99)
HCT VFR BLD CALC: 38.3 % (ref 41–50)
HEMOGLOBIN: 12.7 G/DL (ref 13.5–16.5)
LYMPHOCYTES ABSOLUTE: 1.5 K/UL (ref 1.1–4.8)
LYMPHOCYTES RELATIVE PERCENT: 27.3 % (ref 24–44)
MAGNESIUM: 1.5 MEQ/L (ref 1.6–2.6)
MCH RBC QN AUTO: 31.5 PG (ref 28–34)
MCHC RBC AUTO-ENTMCNC: 33.1 G/DL (ref 33–37)
MCV RBC AUTO: 95.3 FL (ref 80–100)
MONOCYTES ABSOLUTE: 0.7 K/UL (ref 0.2–0.7)
MONOCYTES RELATIVE PERCENT: 11.8 % (ref 3.4–9)
NEUTROPHILS ABSOLUTE: 3.2 K/UL (ref 1.83–8.7)
PDW BLD-RTO: 13.2 % (ref 10.9–14.3)
PHOSPHORUS: 2.9 MG/DL (ref 2.5–4.6)
PLATELET # BLD: 239 K/UL (ref 150–450)
PMV BLD AUTO: 7.5 FL (ref 7.4–10.4)
POTASSIUM SERPL-SCNC: 4.6 MEQ/L (ref 3.6–5)
PROTEIN/CREAT RATIO URINE RAN: 322 MG/G
RBC # BLD: 4.02 M/UL (ref 4.5–5.5)
SEGMENTED NEUTROPHILS RELATIVE PERCENT: 57.5 % (ref 40–74)
SODIUM BLD-SCNC: 134 MEQ/L (ref 135–145)
TOTAL PROTEIN, URINE: 29 MG/DL
TOTAL PROTEIN: 7.4 G/DL (ref 5.9–7.8)
URATE: 5.6 MG/DL (ref 4.4–7.6)
WBC # BLD: 5.6 K/UL (ref 4.5–11)

## 2022-12-29 LAB — DIABETIC RETINOPATHY: NEGATIVE

## 2023-03-20 LAB
BASOPHILS ABSOLUTE: 0.1 K/UL (ref 0–0.2)
BASOPHILS RELATIVE PERCENT: 0.8 % (ref 0–1.5)
CREATININE URINE: 119.9 MG/DL (ref 22–328)
EOSINOPHILS ABSOLUTE: 0.2 K/UL (ref 0–0.33)
EOSINOPHILS RELATIVE PERCENT: 2.4 % (ref 0–3)
HCT VFR BLD CALC: 37.1 % (ref 41–50)
HEMOGLOBIN: 12.4 G/DL (ref 13.5–16.5)
LYMPHOCYTES ABSOLUTE: 2 K/UL (ref 1.1–4.8)
LYMPHOCYTES RELATIVE PERCENT: 28 % (ref 24–44)
MAGNESIUM: 1.5 MEQ/L (ref 1.6–2.6)
MCH RBC QN AUTO: 31.7 PG (ref 28–34)
MCHC RBC AUTO-ENTMCNC: 33.4 G/DL (ref 33–37)
MCV RBC AUTO: 94.9 FL (ref 80–100)
MONOCYTES ABSOLUTE: 0.8 K/UL (ref 0.2–0.7)
MONOCYTES RELATIVE PERCENT: 11.5 % (ref 3.4–9)
NEUTROPHILS ABSOLUTE: 4.1 K/UL (ref 1.83–8.7)
PDW BLD-RTO: 13.5 % (ref 10.9–14.3)
PHOSPHORUS: 2.9 MG/DL (ref 2.5–4.6)
PLATELET # BLD: 244 K/UL (ref 150–450)
PMV BLD AUTO: 7.7 FL (ref 7.4–10.4)
PROTEIN, URINE, RANDOM: 42 MG/DL
PROTEIN/CREAT RATIO URINE RAN: 350 MG/G
RBC # BLD: 3.92 M/UL (ref 4.5–5.5)
SEGMENTED NEUTROPHILS RELATIVE PERCENT: 57.3 % (ref 40–74)
URATE: 5.4 MG/DL (ref 4.4–7.6)
WBC # BLD: 7.1 K/UL (ref 4.5–11)

## 2023-07-07 RX ORDER — PANTOPRAZOLE SODIUM 20 MG/1
TABLET, DELAYED RELEASE ORAL
Qty: 90 TABLET | Refills: 1 | OUTPATIENT
Start: 2023-07-07

## 2023-08-12 DIAGNOSIS — I25.10 CORONARY ARTERY DISEASE INVOLVING NATIVE HEART WITHOUT ANGINA PECTORIS, UNSPECIFIED VESSEL OR LESION TYPE: ICD-10-CM

## 2023-08-16 RX ORDER — CLOPIDOGREL BISULFATE 75 MG/1
75 TABLET ORAL DAILY
Qty: 90 TABLET | Refills: 1 | OUTPATIENT
Start: 2023-08-16

## 2023-10-08 DIAGNOSIS — I10 ESSENTIAL HYPERTENSION: ICD-10-CM

## 2023-11-12 DIAGNOSIS — I25.10 CORONARY ARTERY DISEASE INVOLVING NATIVE HEART WITHOUT ANGINA PECTORIS, UNSPECIFIED VESSEL OR LESION TYPE: ICD-10-CM

## 2023-11-13 RX ORDER — ATORVASTATIN CALCIUM 20 MG/1
20 TABLET, FILM COATED ORAL EVERY OTHER DAY
Qty: 45 TABLET | Refills: 1 | OUTPATIENT
Start: 2023-11-13